# Patient Record
Sex: MALE | Race: ASIAN | URBAN - METROPOLITAN AREA
[De-identification: names, ages, dates, MRNs, and addresses within clinical notes are randomized per-mention and may not be internally consistent; named-entity substitution may affect disease eponyms.]

---

## 2023-07-20 ENCOUNTER — OFFICE VISIT (OUTPATIENT)
Dept: FAMILY MEDICINE CLINIC | Age: 83
End: 2023-07-20
Payer: COMMERCIAL

## 2023-07-20 VITALS
SYSTOLIC BLOOD PRESSURE: 100 MMHG | OXYGEN SATURATION: 97 % | HEART RATE: 78 BPM | HEIGHT: 64 IN | RESPIRATION RATE: 16 BRPM | DIASTOLIC BLOOD PRESSURE: 56 MMHG | TEMPERATURE: 97.1 F | BODY MASS INDEX: 21.72 KG/M2 | WEIGHT: 127.2 LBS

## 2023-07-20 DIAGNOSIS — F51.01 PRIMARY INSOMNIA: ICD-10-CM

## 2023-07-20 DIAGNOSIS — Z86.73 HISTORY OF CVA (CEREBROVASCULAR ACCIDENT): ICD-10-CM

## 2023-07-20 DIAGNOSIS — K21.9 GASTROESOPHAGEAL REFLUX DISEASE WITHOUT ESOPHAGITIS: ICD-10-CM

## 2023-07-20 DIAGNOSIS — I10 PRIMARY HYPERTENSION: ICD-10-CM

## 2023-07-20 DIAGNOSIS — I65.23 BILATERAL CAROTID ARTERY STENOSIS: ICD-10-CM

## 2023-07-20 DIAGNOSIS — Z91.81 AT HIGH RISK FOR FALLS: ICD-10-CM

## 2023-07-20 DIAGNOSIS — Z98.890 HISTORY OF CAROTID ENDARTERECTOMY: ICD-10-CM

## 2023-07-20 DIAGNOSIS — Z76.89 ENCOUNTER TO ESTABLISH CARE: Primary | ICD-10-CM

## 2023-07-20 LAB
ALBUMIN SERPL-MCNC: 4.2 G/DL (ref 3.4–5)
ALBUMIN/GLOB SERPL: 1.4 {RATIO} (ref 1.1–2.2)
ALP SERPL-CCNC: 71 U/L (ref 40–129)
ALT SERPL-CCNC: 11 U/L (ref 10–40)
ANION GAP SERPL CALCULATED.3IONS-SCNC: 10 MMOL/L (ref 3–16)
AST SERPL-CCNC: 16 U/L (ref 15–37)
BASOPHILS # BLD: 0 K/UL (ref 0–0.2)
BASOPHILS NFR BLD: 0.8 %
BILIRUB SERPL-MCNC: 0.9 MG/DL (ref 0–1)
BUN SERPL-MCNC: 9 MG/DL (ref 7–20)
CALCIUM SERPL-MCNC: 10 MG/DL (ref 8.3–10.6)
CHLORIDE SERPL-SCNC: 102 MMOL/L (ref 99–110)
CO2 SERPL-SCNC: 29 MMOL/L (ref 21–32)
CREAT SERPL-MCNC: 1.1 MG/DL (ref 0.8–1.3)
DEPRECATED RDW RBC AUTO: 14.5 % (ref 12.4–15.4)
EOSINOPHIL # BLD: 0.1 K/UL (ref 0–0.6)
EOSINOPHIL NFR BLD: 2.9 %
GFR SERPLBLD CREATININE-BSD FMLA CKD-EPI: >60 ML/MIN/{1.73_M2}
GLUCOSE SERPL-MCNC: 173 MG/DL (ref 70–99)
HCT VFR BLD AUTO: 39.2 % (ref 40.5–52.5)
HGB BLD-MCNC: 13.6 G/DL (ref 13.5–17.5)
LYMPHOCYTES # BLD: 1.1 K/UL (ref 1–5.1)
LYMPHOCYTES NFR BLD: 32.8 %
MCH RBC QN AUTO: 29.7 PG (ref 26–34)
MCHC RBC AUTO-ENTMCNC: 34.6 G/DL (ref 31–36)
MCV RBC AUTO: 85.8 FL (ref 80–100)
MONOCYTES # BLD: 0.5 K/UL (ref 0–1.3)
MONOCYTES NFR BLD: 13.9 %
NEUTROPHILS # BLD: 1.6 K/UL (ref 1.7–7.7)
NEUTROPHILS NFR BLD: 49.6 %
PLATELET # BLD AUTO: 123 K/UL (ref 135–450)
PMV BLD AUTO: 9.1 FL (ref 5–10.5)
POTASSIUM SERPL-SCNC: 3.8 MMOL/L (ref 3.5–5.1)
PROT SERPL-MCNC: 7.1 G/DL (ref 6.4–8.2)
RBC # BLD AUTO: 4.57 M/UL (ref 4.2–5.9)
SODIUM SERPL-SCNC: 141 MMOL/L (ref 136–145)
WBC # BLD AUTO: 3.3 K/UL (ref 4–11)

## 2023-07-20 PROCEDURE — 3078F DIAST BP <80 MM HG: CPT | Performed by: FAMILY MEDICINE

## 2023-07-20 PROCEDURE — G0442 ANNUAL ALCOHOL SCREEN 15 MIN: HCPCS | Performed by: FAMILY MEDICINE

## 2023-07-20 PROCEDURE — 3725F SCREEN DEPRESSION PERFORMED: CPT | Performed by: FAMILY MEDICINE

## 2023-07-20 PROCEDURE — 1123F ACP DISCUSS/DSCN MKR DOCD: CPT | Performed by: FAMILY MEDICINE

## 2023-07-20 PROCEDURE — 1000F TOBACCO USE ASSESSED: CPT | Performed by: FAMILY MEDICINE

## 2023-07-20 PROCEDURE — 3074F SYST BP LT 130 MM HG: CPT | Performed by: FAMILY MEDICINE

## 2023-07-20 PROCEDURE — 99204 OFFICE O/P NEW MOD 45 MIN: CPT | Performed by: FAMILY MEDICINE

## 2023-07-20 RX ORDER — CARVEDILOL 3.12 MG/1
3.12 TABLET ORAL 2 TIMES DAILY WITH MEALS
COMMUNITY
End: 2023-07-20

## 2023-07-20 RX ORDER — AMLODIPINE BESYLATE 5 MG/1
5 TABLET ORAL DAILY
COMMUNITY
End: 2023-07-20 | Stop reason: SDUPTHER

## 2023-07-20 RX ORDER — ATORVASTATIN CALCIUM 40 MG/1
40 TABLET, FILM COATED ORAL NIGHTLY
Qty: 90 TABLET | Refills: 3 | Status: SHIPPED | OUTPATIENT
Start: 2023-07-20

## 2023-07-20 RX ORDER — PANTOPRAZOLE SODIUM 40 MG/1
40 TABLET, DELAYED RELEASE ORAL DAILY
Qty: 90 TABLET | Refills: 3 | Status: SHIPPED | OUTPATIENT
Start: 2023-07-20

## 2023-07-20 RX ORDER — ACETAMINOPHEN 325 MG/1
650 TABLET ORAL EVERY 4 HOURS PRN
COMMUNITY

## 2023-07-20 RX ORDER — LISINOPRIL 10 MG/1
10 TABLET ORAL DAILY
Qty: 90 TABLET | Refills: 3 | Status: SHIPPED | OUTPATIENT
Start: 2023-07-20

## 2023-07-20 RX ORDER — ASPIRIN 81 MG/1
81 TABLET ORAL DAILY
Qty: 90 TABLET | Refills: 3 | Status: SHIPPED | OUTPATIENT
Start: 2023-07-20

## 2023-07-20 RX ORDER — AMLODIPINE BESYLATE 5 MG/1
5 TABLET ORAL DAILY
Qty: 90 TABLET | Refills: 3 | Status: SHIPPED | OUTPATIENT
Start: 2023-07-20

## 2023-07-20 RX ORDER — ATORVASTATIN CALCIUM 40 MG/1
40 TABLET, FILM COATED ORAL NIGHTLY
COMMUNITY
End: 2023-07-20 | Stop reason: SDUPTHER

## 2023-07-20 RX ORDER — LISINOPRIL 10 MG/1
10 TABLET ORAL DAILY
COMMUNITY
End: 2023-07-20 | Stop reason: SDUPTHER

## 2023-07-20 RX ORDER — ISOSORBIDE MONONITRATE 60 MG/1
60 TABLET, EXTENDED RELEASE ORAL DAILY
COMMUNITY
End: 2023-07-20

## 2023-07-20 RX ORDER — TRAZODONE HYDROCHLORIDE 100 MG/1
100 TABLET ORAL NIGHTLY
COMMUNITY
End: 2023-07-20 | Stop reason: SDUPTHER

## 2023-07-20 RX ORDER — CLOPIDOGREL BISULFATE 75 MG/1
75 TABLET ORAL DAILY
COMMUNITY
End: 2023-07-20

## 2023-07-20 RX ORDER — ASPIRIN 81 MG/1
81 TABLET, CHEWABLE ORAL NIGHTLY
COMMUNITY
End: 2023-07-20

## 2023-07-20 RX ORDER — TRAZODONE HYDROCHLORIDE 100 MG/1
100 TABLET ORAL NIGHTLY
Qty: 90 TABLET | Refills: 3 | Status: SHIPPED | OUTPATIENT
Start: 2023-07-20

## 2023-07-20 RX ORDER — PANTOPRAZOLE SODIUM 40 MG/1
40 TABLET, DELAYED RELEASE ORAL DAILY
COMMUNITY
End: 2023-07-20 | Stop reason: SDUPTHER

## 2023-07-20 ASSESSMENT — PATIENT HEALTH QUESTIONNAIRE - PHQ9
SUM OF ALL RESPONSES TO PHQ QUESTIONS 1-9: 2
2. FEELING DOWN, DEPRESSED OR HOPELESS: 1
SUM OF ALL RESPONSES TO PHQ QUESTIONS 1-9: 2
SUM OF ALL RESPONSES TO PHQ9 QUESTIONS 1 & 2: 2
1. LITTLE INTEREST OR PLEASURE IN DOING THINGS: 1

## 2023-07-20 NOTE — PROGRESS NOTES
ASSESSMENT/PLAN:  1. Encounter to establish care  2. At high risk for falls  3. History of CVA (cerebrovascular accident)  Est, stable. Uncertain etiology, however, pt has hx of endarterectomy, so possibly related to carotid stenosis. Scant records to review. Uncertain when pt had CEA. Stop plavix for now. Continue aspirin and high intensity statin for secondary prevention. Repeat carotid US  Fasting labs today. May need referral to vascular but didn't discuss this  -     atorvastatin (LIPITOR) 40 MG tablet; Take 1 tablet by mouth at bedtime, Disp-90 tablet, R-3Normal  -     Hemoglobin A1C; Future  -     Comprehensive Metabolic Panel; Future  -     CBC with Auto Differential; Future  -     aspirin 81 MG EC tablet; Take 1 tablet by mouth daily, Disp-90 tablet, R-3Normal  -     VL DUP CAROTID BILATERAL; Future  4. Bilateral carotid artery stenosis  -     VL DUP CAROTID BILATERAL; Future  5. History of carotid endarterectomy  -     VL DUP CAROTID BILATERAL; Future  6. Primary insomnia  Est, uncontrolled. Restart trazodone  -     traZODone (DESYREL) 100 MG tablet; Take 1 tablet by mouth nightly, Disp-90 tablet, R-3Normal  7. Gastroesophageal reflux disease without esophagitis  Est, uncontrolled. Restart protonix  -     pantoprazole (PROTONIX) 40 MG tablet; Take 1 tablet by mouth daily, Disp-90 tablet, R-3Normal  8. Primary hypertension  Est, possibly overcontrolled as very low here. Check home bps. Goal 130/80. Continue norvasc 5mg and lisinopril 5mg only. Previously also on imdur.  -     amLODIPine (NORVASC) 5 MG tablet; Take 1 tablet by mouth daily, Disp-90 tablet, R-3Normal  -     lisinopril (PRINIVIL;ZESTRIL) 10 MG tablet; Take 1 tablet by mouth daily, Disp-90 tablet, R-3Normal      Return in about 1 month (around 8/20/2023) for lab review, DM2, HTN follow up.     Electronically signed by Eileen Crouch MD on 7/20/2023 at 2:12 PM EDT    Please note, portions of this note were completed with a voice recognition

## 2023-07-20 NOTE — PATIENT INSTRUCTIONS
Blood work today     BLOOD PRESSURE should be 130/80    Stop metformin and carvedilol    See me in 1 month

## 2023-07-21 LAB
EST. AVERAGE GLUCOSE BLD GHB EST-MCNC: 119.8 MG/DL
HBA1C MFR BLD: 5.8 %

## 2023-07-24 ENCOUNTER — TELEPHONE (OUTPATIENT)
Dept: FAMILY MEDICINE CLINIC | Age: 83
End: 2023-07-24

## 2023-07-24 RX ORDER — ISOSORBIDE MONONITRATE 60 MG/1
TABLET, EXTENDED RELEASE ORAL
COMMUNITY
Start: 2023-07-21

## 2023-07-24 NOTE — TELEPHONE ENCOUNTER
Patient son called in asking  for a refill on Plavix but it looks like it has been discontinued.   Please advise and send in the medication if the patient should be taking this medication

## 2023-08-04 ENCOUNTER — TELEPHONE (OUTPATIENT)
Dept: FAMILY MEDICINE CLINIC | Age: 83
End: 2023-08-04

## 2023-08-04 NOTE — TELEPHONE ENCOUNTER
Patient's child Brian Day) called on his behalf to check in on home healthcare paperwork given to provider on previous appointment date (07/20/2023). They'd like to know if it has been completed and if it can be sent to home care company. Home care states they have not yet received anything from our office and I was unable to locate paperwork in . Please advise.

## 2023-08-24 ENCOUNTER — HOSPITAL ENCOUNTER (OUTPATIENT)
Dept: VASCULAR LAB | Age: 83
Discharge: HOME | End: 2023-08-24
Attending: FAMILY MEDICINE
Payer: COMMERCIAL

## 2023-08-24 DIAGNOSIS — Z86.73 HISTORY OF CVA (CEREBROVASCULAR ACCIDENT): ICD-10-CM

## 2023-08-24 DIAGNOSIS — I65.23 BILATERAL CAROTID ARTERY STENOSIS: ICD-10-CM

## 2023-08-24 DIAGNOSIS — Z98.890 HISTORY OF CAROTID ENDARTERECTOMY: ICD-10-CM

## 2023-08-24 PROCEDURE — 93880 EXTRACRANIAL BILAT STUDY: CPT

## 2023-08-25 ENCOUNTER — OFFICE VISIT (OUTPATIENT)
Dept: FAMILY MEDICINE CLINIC | Age: 83
End: 2023-08-25
Payer: COMMERCIAL

## 2023-08-25 VITALS
SYSTOLIC BLOOD PRESSURE: 124 MMHG | HEIGHT: 64 IN | HEART RATE: 72 BPM | WEIGHT: 126 LBS | OXYGEN SATURATION: 98 % | DIASTOLIC BLOOD PRESSURE: 68 MMHG | BODY MASS INDEX: 21.51 KG/M2

## 2023-08-25 DIAGNOSIS — Z11.59 NEED FOR HEPATITIS C SCREENING TEST: ICD-10-CM

## 2023-08-25 DIAGNOSIS — Z98.890 HISTORY OF CAROTID ENDARTERECTOMY: ICD-10-CM

## 2023-08-25 DIAGNOSIS — D70.8 OTHER NEUTROPENIA (HCC): Primary | ICD-10-CM

## 2023-08-25 DIAGNOSIS — I21.9 MYOCARDIAL INFARCTION, UNSPECIFIED MI TYPE, UNSPECIFIED ARTERY (HCC): ICD-10-CM

## 2023-08-25 DIAGNOSIS — Z86.73 HISTORY OF CVA (CEREBROVASCULAR ACCIDENT): ICD-10-CM

## 2023-08-25 DIAGNOSIS — Z11.9 SCREENING EXAMINATION FOR INFECTIOUS DISEASE: ICD-10-CM

## 2023-08-25 DIAGNOSIS — D70.8 OTHER NEUTROPENIA (HCC): ICD-10-CM

## 2023-08-25 LAB
BASOPHILS # BLD: 0 K/UL (ref 0–0.2)
BASOPHILS NFR BLD: 0.8 %
CHOLEST SERPL-MCNC: 98 MG/DL (ref 0–199)
DEPRECATED RDW RBC AUTO: 13.6 % (ref 12.4–15.4)
EOSINOPHIL # BLD: 0.1 K/UL (ref 0–0.6)
EOSINOPHIL NFR BLD: 2.3 %
HCT VFR BLD AUTO: 43.6 % (ref 40.5–52.5)
HCV AB SERPL QL IA: NORMAL
HDLC SERPL-MCNC: 44 MG/DL (ref 40–60)
HGB BLD-MCNC: 14.8 G/DL (ref 13.5–17.5)
LDLC SERPL CALC-MCNC: 34 MG/DL
LYMPHOCYTES # BLD: 1 K/UL (ref 1–5.1)
LYMPHOCYTES NFR BLD: 28 %
MCH RBC QN AUTO: 28.7 PG (ref 26–34)
MCHC RBC AUTO-ENTMCNC: 33.9 G/DL (ref 31–36)
MCV RBC AUTO: 84.7 FL (ref 80–100)
MONOCYTES # BLD: 0.5 K/UL (ref 0–1.3)
MONOCYTES NFR BLD: 12.9 %
NEUTROPHILS # BLD: 2 K/UL (ref 1.7–7.7)
NEUTROPHILS NFR BLD: 56 %
PLATELET # BLD AUTO: 130 K/UL (ref 135–450)
PMV BLD AUTO: 9.9 FL (ref 5–10.5)
RBC # BLD AUTO: 5.15 M/UL (ref 4.2–5.9)
TRIGL SERPL-MCNC: 99 MG/DL (ref 0–150)
VLDLC SERPL CALC-MCNC: 20 MG/DL
WBC # BLD AUTO: 3.6 K/UL (ref 4–11)

## 2023-08-25 PROCEDURE — 1123F ACP DISCUSS/DSCN MKR DOCD: CPT | Performed by: FAMILY MEDICINE

## 2023-08-25 PROCEDURE — 3074F SYST BP LT 130 MM HG: CPT | Performed by: FAMILY MEDICINE

## 2023-08-25 PROCEDURE — 3078F DIAST BP <80 MM HG: CPT | Performed by: FAMILY MEDICINE

## 2023-08-25 PROCEDURE — 99214 OFFICE O/P EST MOD 30 MIN: CPT | Performed by: FAMILY MEDICINE

## 2023-08-25 RX ORDER — NITROGLYCERIN 0.4 MG/1
TABLET SUBLINGUAL
COMMUNITY

## 2023-08-25 RX ORDER — CARVEDILOL 3.12 MG/1
3.12 TABLET ORAL 2 TIMES DAILY
Qty: 180 TABLET | Refills: 3 | Status: SHIPPED | OUTPATIENT
Start: 2023-08-25

## 2023-08-25 RX ORDER — CARVEDILOL 3.12 MG/1
1 TABLET ORAL 2 TIMES DAILY
COMMUNITY
End: 2023-08-25 | Stop reason: ALTCHOICE

## 2023-08-25 NOTE — PROGRESS NOTES
Chief complaint: Follow-up      SUBJECTIVE:  HPI  Arvind Swanson (:  1940) is a 78 y.o. male with a past medical history of DM2, HTN, HLD, CVA x3, last in 2023,. Hx of Rt carotid endarterectomy in 2019  who presents with a chief complaint of: 1 mos f/u. Established w/ me on . Here with son and sister. Had carotid US yesterday. Reviewed w/ pt - normal.    He feels much better on current blood pressure meds- only norvasc and lisinopril. He is no longer dizzy and can walk up stairs with more energy. He couldn't do stairs before. he's happy with me. Hx of MI. No chest pain, sob, swelling in feet. I took him of carvedilol. Hx of MI on paperwork (son states he hasn't had a heart attack. No hx of stends or heart surgery) only saw heart doctor once when he had a stroke, in the hospital.     Selvin Verduzco is here with him, his son. Lives with son, his son's 2 kids, pt, pt's sister and daughter in law.     Patient Active Problem List   Diagnosis    At high risk for falls    History of CVA (cerebrovascular accident)    Bilateral carotid artery stenosis    History of carotid endarterectomy    Primary insomnia    Gastroesophageal reflux disease without esophagitis    Myocardial infarction ([address])    Hypertensive disorder    Mental disorder    Other neutropenia ([address])     Past Medical History:   Diagnosis Date    DM (diabetes mellitus), type 2 ([address] [facility])     Hypercholesteremia     Hypertension     Stroke [facility])      Current Outpatient Medications on File Prior to Visit   Medication Sig Dispense Refill    acetaminophen (TYLENOL) 325 MG tablet Take 2 tablets by mouth every 4 hours as needed for Pain      atorvastatin (LIPITOR) 40 MG tablet Take 1 tablet by mouth at bedtime 90 tablet 3    traZODone (DESYREL) 100 MG tablet Take 1 tablet by mouth nightly 90 tablet 3    pantoprazole (PROTONIX) 40 MG tablet Take 1 tablet by mouth daily 90 tablet 3    aspirin 81 MG EC tablet Take 1 tablet by mouth daily 90 tablet 3    lisinopril

## 2023-08-26 LAB
HIV 1+2 AB+HIV1 P24 AG SERPL QL IA: NORMAL
HIV 2 AB SERPL QL IA: NORMAL
HIV1 AB SERPL QL IA: NORMAL
HIV1 P24 AG SERPL QL IA: NORMAL

## 2023-09-22 ENCOUNTER — OFFICE VISIT (OUTPATIENT)
Dept: FAMILY MEDICINE CLINIC | Age: 83
End: 2023-09-22
Payer: COMMERCIAL

## 2023-09-22 VITALS — SYSTOLIC BLOOD PRESSURE: 118 MMHG | HEART RATE: 72 BPM | DIASTOLIC BLOOD PRESSURE: 62 MMHG

## 2023-09-22 DIAGNOSIS — I21.9 MYOCARDIAL INFARCTION, UNSPECIFIED MI TYPE, UNSPECIFIED ARTERY (HCC): ICD-10-CM

## 2023-09-22 DIAGNOSIS — Z23 NEED FOR VACCINATION: ICD-10-CM

## 2023-09-22 DIAGNOSIS — Z09 HOSPITAL DISCHARGE FOLLOW-UP: Primary | ICD-10-CM

## 2023-09-22 DIAGNOSIS — I65.23 BILATERAL CAROTID ARTERY STENOSIS: ICD-10-CM

## 2023-09-22 DIAGNOSIS — R73.9 ELEVATED BLOOD SUGAR: ICD-10-CM

## 2023-09-22 DIAGNOSIS — Z86.73 HISTORY OF CVA (CEREBROVASCULAR ACCIDENT): ICD-10-CM

## 2023-09-22 PROBLEM — E11.9 DIABETES MELLITUS (HCC): Status: ACTIVE | Noted: 2023-09-13

## 2023-09-22 PROBLEM — I63.9 ACUTE STROKE DUE TO ISCHEMIA (HCC): Status: ACTIVE | Noted: 2023-09-13

## 2023-09-22 PROCEDURE — 90471 IMMUNIZATION ADMIN: CPT | Performed by: FAMILY MEDICINE

## 2023-09-22 PROCEDURE — 90694 VACC AIIV4 NO PRSRV 0.5ML IM: CPT | Performed by: FAMILY MEDICINE

## 2023-09-22 PROCEDURE — 99214 OFFICE O/P EST MOD 30 MIN: CPT | Performed by: FAMILY MEDICINE

## 2023-09-22 PROCEDURE — 3074F SYST BP LT 130 MM HG: CPT | Performed by: FAMILY MEDICINE

## 2023-09-22 PROCEDURE — 1111F DSCHRG MED/CURRENT MED MERGE: CPT | Performed by: FAMILY MEDICINE

## 2023-09-22 PROCEDURE — 3078F DIAST BP <80 MM HG: CPT | Performed by: FAMILY MEDICINE

## 2023-09-22 PROCEDURE — 1123F ACP DISCUSS/DSCN MKR DOCD: CPT | Performed by: FAMILY MEDICINE

## 2023-09-22 RX ORDER — CLOPIDOGREL BISULFATE 75 MG/1
75 TABLET ORAL DAILY
COMMUNITY
Start: 2023-09-14

## 2023-09-22 NOTE — PROGRESS NOTES
Post-Discharge Transitional Care Management Progress Note      Bruno Romo   YOB: 1940    Date of Office Visit:  9/22/2023  Date of Hospital Admission: 9/13  Date of Hospital Discharge: 9/14    Care management risk score Rising risk (score 2-5) and Complex Care (Scores >=6): No Risk Score On File     Non face to face  following discharge, date last encounter closed (first attempt may have been earlier): *No documented post hospital discharge outreach found in the last 14 days *No documented post hospital discharge outreach found in the last 14 days    Call initiated 2 business days of discharge: *No response recorded in the last 14 days    ASSESSMENT/PLAN:   Hospital discharge follow-up  -     [place] DISCHARGE MEDS RECONCILED W/ CURRENT OUTPATIENT MED LIST  -     External Referral To Home Health  History of CVA (cerebrovascular accident)  Est, stable. MRI neg for acute CVA. Continue plavix lifelong. Stop aspirin 81mg daily. Call to schedule with neurology at [facility]. Phone # provided  Will place referral to [address] [facility] RN - son hasn't had a call yet, which seems too long to me. Recheck labs. -     External Referral To Home Health  -     Basic Metabolic Panel; Future  -     Hemoglobin A1C; Future  Myocardial infarction, unspecified MI type, unspecified artery ([address])  -     External Referral To Home Health  Bilateral carotid artery stenosis  -     External Referral To Home Health  Elevated blood sugar  -     Basic Metabolic Panel; Future  -     Hemoglobin A1C; Future  Need for vaccination  -     Influenza, FLUAD, (age 72 y+), IM, Preservative Free, 0.5 mL    Medical Decision Making: moderate complexity  Return in about 4 weeks (around 10/20/2023) for blood work follow up.          Subjective:   HPI:  Follow up of Hospital problems/diagnosis(es):   Acute right-sided weakness and dysarthria-cannot rule out or rule out left hemispheric CVA    Active Problems:  Diabetes mellitus (HC CODE)  Essential

## 2023-09-22 NOTE — PATIENT INSTRUCTIONS
Ranjan Becerra, APRN  [address]  [phone]    Follow up  Call to schedule 4 week follow up appointment

## 2023-10-09 DIAGNOSIS — I21.9 MYOCARDIAL INFARCTION, UNSPECIFIED MI TYPE, UNSPECIFIED ARTERY (HCC): ICD-10-CM

## 2023-10-09 RX ORDER — CARVEDILOL 3.12 MG/1
3.12 TABLET ORAL 2 TIMES DAILY
Qty: 180 TABLET | Refills: 3 | Status: SHIPPED | OUTPATIENT
Start: 2023-10-09

## 2023-10-09 NOTE — TELEPHONE ENCOUNTER
Medication:   Requested Prescriptions     Pending Prescriptions Disp Refills    carvedilol (COREG) 3.125 MG tablet 180 tablet 3     Sig: Take 1 tablet by mouth 2 times daily        Last Filled:  08/25/2023 #[id number]    Patient Phone Number: [phone] (home)     Last appt: 9/22/2023   Next appt: 10/20/2023    Last OARRS:        No data to display              Patient states he is out of the medication and that the pharmacy needs a refill

## 2023-10-20 ENCOUNTER — OFFICE VISIT (OUTPATIENT)
Dept: FAMILY MEDICINE CLINIC | Age: 83
End: 2023-10-20

## 2023-10-20 VITALS — DIASTOLIC BLOOD PRESSURE: 68 MMHG | SYSTOLIC BLOOD PRESSURE: 112 MMHG | HEART RATE: 84 BPM | OXYGEN SATURATION: 100 %

## 2023-10-20 DIAGNOSIS — Z98.890 HISTORY OF CAROTID ENDARTERECTOMY: ICD-10-CM

## 2023-10-20 DIAGNOSIS — I10 PRIMARY HYPERTENSION: ICD-10-CM

## 2023-10-20 DIAGNOSIS — H53.413 VISION LOSS, CENTRAL, BILATERAL: ICD-10-CM

## 2023-10-20 DIAGNOSIS — E11.59 TYPE 2 DIABETES MELLITUS WITH OTHER CIRCULATORY COMPLICATION, WITHOUT LONG-TERM CURRENT USE OF INSULIN (HCC): Primary | ICD-10-CM

## 2023-10-20 DIAGNOSIS — Z86.73 HISTORY OF CVA (CEREBROVASCULAR ACCIDENT): ICD-10-CM

## 2023-10-20 DIAGNOSIS — I63.9 ACUTE STROKE DUE TO ISCHEMIA (HCC): ICD-10-CM

## 2023-10-20 RX ORDER — DIVALPROEX SODIUM 250 MG/1
TABLET, DELAYED RELEASE ORAL
COMMUNITY
Start: 2023-10-17

## 2023-10-20 RX ORDER — MEMANTINE HYDROCHLORIDE 5 MG/1
5 TABLET ORAL 2 TIMES DAILY
COMMUNITY

## 2023-10-20 NOTE — PROGRESS NOTES
Chief complaint: Follow-up      SUBJECTIVE:  HPI  Bud Hinds (:  1940) is a 78 y.o. male with a past medical history of DM who presents with a chief complaint of: f/u labs. Saw neuro at [facility] - started memantine and depakote. Instructed to call late oct/early nov for f/u. Memory loss. Lipitor inc to 40mg daily. Needs referral to dentist and eye doctor  Medicaid ins. Had catgaract surgery but vision is worsening again. Wants to see eye doctor.     Patient Active Problem List   Diagnosis    At high risk for falls    History of CVA (cerebrovascular accident)    Bilateral carotid artery stenosis    History of carotid endarterectomy    Primary insomnia    Gastroesophageal reflux disease without esophagitis    Myocardial infarction ([address])    Hypertensive disorder    Mental disorder    Other neutropenia (HCC)    Diabetes mellitus ([address])    Vision loss, central, bilateral     Past Medical History:   Diagnosis Date    DM (diabetes mellitus), type 2 ([address])     Hypercholesteremia     Hypertension     Stroke [facility])      Current Outpatient Medications on File Prior to Visit   Medication Sig Dispense Refill    divalproex (DEPAKOTE) 250 MG DR tablet       memantine (NAMENDA) 5 MG tablet Take 1 tablet by mouth 2 times daily      carvedilol (COREG) 3.125 MG tablet Take 1 tablet by mouth 2 times daily 180 tablet 3    clopidogrel (PLAVIX) 75 MG tablet Take 1 tablet by mouth daily      nitroGLYCERIN (NITROSTAT) 0.4 MG SL tablet PLEASE SEE ATTACHED FOR DETAILED DIRECTIONS      acetaminophen (TYLENOL) 325 MG tablet Take 2 tablets by mouth every 4 hours as needed for Pain      atorvastatin (LIPITOR) 40 MG tablet Take 1 tablet by mouth at bedtime 90 tablet 3    traZODone (DESYREL) 100 MG tablet Take 1 tablet by mouth nightly 90 tablet 3    pantoprazole (PROTONIX) 40 MG tablet Take 1 tablet by mouth daily 90 tablet 3    lisinopril (PRINIVIL;ZESTRIL) 10 MG tablet Take 1 tablet by mouth daily 90 tablet 3     No current

## 2023-10-30 ENCOUNTER — TELEPHONE (OUTPATIENT)
Dept: FAMILY MEDICINE CLINIC | Age: 83
End: 2023-10-30

## 2023-10-30 DIAGNOSIS — Z86.73 HISTORY OF CVA (CEREBROVASCULAR ACCIDENT): Primary | ICD-10-CM

## 2023-10-30 RX ORDER — CLOPIDOGREL BISULFATE 75 MG/1
75 TABLET ORAL DAILY
Qty: 90 TABLET | Refills: 3 | Status: SHIPPED | OUTPATIENT
Start: 2023-10-30

## 2023-10-30 NOTE — TELEPHONE ENCOUNTER
Patient's son Lupe Duff) called regarding medication. ..  clopidogrel (PLAVIX) 75 MG tablet   9/14/2023     Sig - Route: Take 1 tablet by mouth daily - Oral      He'd like to know if patient is still supposed to take this medication. If so, please send a refill to. ..   [address],  [address]

## 2023-12-06 ENCOUNTER — OFFICE VISIT (OUTPATIENT)
Dept: FAMILY MEDICINE CLINIC | Age: 83
End: 2023-12-06
Payer: COMMERCIAL

## 2023-12-06 VITALS
TEMPERATURE: 97 F | RESPIRATION RATE: 16 BRPM | HEART RATE: 73 BPM | OXYGEN SATURATION: 100 % | SYSTOLIC BLOOD PRESSURE: 120 MMHG | WEIGHT: 124 LBS | BODY MASS INDEX: 21.28 KG/M2 | DIASTOLIC BLOOD PRESSURE: 68 MMHG

## 2023-12-06 DIAGNOSIS — Z86.73 HISTORY OF CVA (CEREBROVASCULAR ACCIDENT): Primary | ICD-10-CM

## 2023-12-06 DIAGNOSIS — I10 PRIMARY HYPERTENSION: ICD-10-CM

## 2023-12-06 DIAGNOSIS — Z09 HOSPITAL DISCHARGE FOLLOW-UP: ICD-10-CM

## 2023-12-06 PROCEDURE — 99215 OFFICE O/P EST HI 40 MIN: CPT | Performed by: FAMILY MEDICINE

## 2023-12-06 PROCEDURE — 1111F DSCHRG MED/CURRENT MED MERGE: CPT | Performed by: FAMILY MEDICINE

## 2023-12-06 RX ORDER — DOXYCYCLINE HYCLATE 100 MG
TABLET ORAL
COMMUNITY
Start: 2023-12-01

## 2023-12-06 RX ORDER — FLUTICASONE PROPIONATE 50 MCG
SPRAY, SUSPENSION (ML) NASAL
COMMUNITY
Start: 2023-11-29

## 2023-12-06 RX ORDER — BENZONATATE 100 MG/1
100 CAPSULE ORAL 3 TIMES DAILY PRN
COMMUNITY
Start: 2023-11-28 | End: 2023-12-08

## 2023-12-06 RX ORDER — VALSARTAN 80 MG/1
80 TABLET ORAL DAILY
Qty: 30 TABLET | Refills: 11 | Status: SHIPPED | OUTPATIENT
Start: 2023-12-06

## 2023-12-06 NOTE — PATIENT INSTRUCTIONS
CALL THE  from Home health - where you can get the standard wheelchair and incontinence supplies?     I can send a fax to whatever medical supply takes your insurance  Pull ups  Pads  Wipes    Blood pressure should be 130/80

## 2023-12-06 NOTE — PROGRESS NOTES
Post-Discharge Transitional Care Management Progress Note      Bruno SENIA Padilla   YOB: 1940    Date of Office Visit:  12/6/2023  Date of Hospital Admission: 11/29/23  Date of Hospital Discharge: 12/1/23    Care management risk score Rising risk (score 2-5) and Complex Care (Scores >=6): No Risk Score On File     Non face to face  following discharge, date last encounter closed (first attempt may have been earlier): *No documented post hospital discharge outreach found in the last 14 days *No documented post hospital discharge outreach found in the last 14 days    Call initiated 2 business days of discharge: yes    ASSESSMENT/PLAN:   History of CVA (cerebrovascular accident)  Est, stable. Continue f/u with neuro - Ramila Diego needs to call for MRI w/ CSF flow, neuropsych testing. Pt is wearing holter  monitor for now. Taking depakote 250mg bid and namenda as prescribed by neuro. Higher level of needs- encouraged Ramila Diego to call  to see what other services he qualifies for. More urinary incontinence, not eating as much, not as much energy, seems to be giving up. Talk to  about getting standard wheelchair and incontinence supplies. I need the order and they need to find a DME supplier near their home in [place]. Ramila Diego agrees     Hospital discharge follow-up  -     [place] DISCHARGE MEDS RECONCILED W/ CURRENT OUTPATIENT MED LIST  Primary hypertension  Est, uncontrolled. Systolic [phone] via [facility] RN. Stop lisinopril. Start valsartan 80mg daily. Goal 140/90. call if still above goal. Looser goal d/t age and comorbidities. Need to optimize BP for secondary CVA prevention  -     valsartan (DIOVAN) 80 MG tablet; Take 1 tablet by mouth daily, Disp-30 tablet, R-11Normal      Medical Decision Making: high medical complexity  F/u in 2mos for BP follow up       Subjective:   HPI:  Follow up of Hospital problems/diagnosis(es): CAP    Inpatient course: Discharge summary reviewed- see chart.     Interval

## 2024-01-19 ENCOUNTER — TELEMEDICINE (OUTPATIENT)
Dept: FAMILY MEDICINE CLINIC | Age: 84
End: 2024-01-19
Payer: MEDICARE

## 2024-01-19 DIAGNOSIS — I65.23 BILATERAL CAROTID ARTERY STENOSIS: ICD-10-CM

## 2024-01-19 DIAGNOSIS — Z91.81 AT HIGH RISK FOR FALLS: ICD-10-CM

## 2024-01-19 DIAGNOSIS — R29.6 RECURRENT FALLS: ICD-10-CM

## 2024-01-19 DIAGNOSIS — I50.32 CHRONIC DIASTOLIC CHF (CONGESTIVE HEART FAILURE) (HCC): ICD-10-CM

## 2024-01-19 DIAGNOSIS — D69.6 THROMBOCYTOPENIA, UNSPECIFIED (HCC): ICD-10-CM

## 2024-01-19 DIAGNOSIS — R56.9 SEIZURE (HCC): ICD-10-CM

## 2024-01-19 DIAGNOSIS — Z09 HOSPITAL DISCHARGE FOLLOW-UP: Primary | ICD-10-CM

## 2024-01-19 DIAGNOSIS — E11.59 TYPE 2 DIABETES MELLITUS WITH OTHER CIRCULATORY COMPLICATION, WITHOUT LONG-TERM CURRENT USE OF INSULIN (HCC): ICD-10-CM

## 2024-01-19 DIAGNOSIS — R26.81 GAIT INSTABILITY: ICD-10-CM

## 2024-01-19 PROBLEM — R41.82 ALTERED MENTAL STATUS: Status: RESOLVED | Noted: 2024-01-06 | Resolved: 2024-01-19

## 2024-01-19 PROBLEM — R41.82 ALTERED MENTAL STATUS: Status: ACTIVE | Noted: 2024-01-06

## 2024-01-19 PROBLEM — D70.8 OTHER NEUTROPENIA (HCC): Status: RESOLVED | Noted: 2023-08-25 | Resolved: 2024-01-19

## 2024-01-19 PROBLEM — E11.9 DIABETES MELLITUS (HCC): Status: RESOLVED | Noted: 2023-09-13 | Resolved: 2024-01-19

## 2024-01-19 PROBLEM — I21.9 MYOCARDIAL INFARCTION (HCC): Status: RESOLVED | Noted: 2017-09-21 | Resolved: 2024-01-19

## 2024-01-19 PROCEDURE — 1111F DSCHRG MED/CURRENT MED MERGE: CPT | Performed by: FAMILY MEDICINE

## 2024-01-19 PROCEDURE — 1123F ACP DISCUSS/DSCN MKR DOCD: CPT | Performed by: FAMILY MEDICINE

## 2024-01-19 PROCEDURE — G8427 DOCREV CUR MEDS BY ELIG CLIN: HCPCS | Performed by: FAMILY MEDICINE

## 2024-01-19 PROCEDURE — 99215 OFFICE O/P EST HI 40 MIN: CPT | Performed by: FAMILY MEDICINE

## 2024-01-19 RX ORDER — LEVETIRACETAM 500 MG/1
500 TABLET ORAL 2 TIMES DAILY
COMMUNITY
Start: 2024-01-14

## 2024-01-19 RX ORDER — WARFARIN SODIUM 3 MG/1
3 TABLET ORAL DAILY
COMMUNITY
Start: 2024-01-14

## 2024-01-19 SDOH — ECONOMIC STABILITY: FOOD INSECURITY: WITHIN THE PAST 12 MONTHS, THE FOOD YOU BOUGHT JUST DIDN'T LAST AND YOU DIDN'T HAVE MONEY TO GET MORE.: NEVER TRUE

## 2024-01-19 SDOH — ECONOMIC STABILITY: INCOME INSECURITY: HOW HARD IS IT FOR YOU TO PAY FOR THE VERY BASICS LIKE FOOD, HOUSING, MEDICAL CARE, AND HEATING?: NOT HARD AT ALL

## 2024-01-19 SDOH — ECONOMIC STABILITY: HOUSING INSECURITY
IN THE LAST 12 MONTHS, WAS THERE A TIME WHEN YOU DID NOT HAVE A STEADY PLACE TO SLEEP OR SLEPT IN A SHELTER (INCLUDING NOW)?: NO

## 2024-01-19 SDOH — ECONOMIC STABILITY: FOOD INSECURITY: WITHIN THE PAST 12 MONTHS, YOU WORRIED THAT YOUR FOOD WOULD RUN OUT BEFORE YOU GOT MONEY TO BUY MORE.: NEVER TRUE

## 2024-01-19 ASSESSMENT — PATIENT HEALTH QUESTIONNAIRE - PHQ9
SUM OF ALL RESPONSES TO PHQ QUESTIONS 1-9: 0
1. LITTLE INTEREST OR PLEASURE IN DOING THINGS: 0
SUM OF ALL RESPONSES TO PHQ9 QUESTIONS 1 & 2: 0
2. FEELING DOWN, DEPRESSED OR HOPELESS: 0

## 2024-01-19 NOTE — PROGRESS NOTES
Post-Discharge Transitional Care Management Progress Note      Bruno Schmidt   YOB: 1940    Date of Office Visit:  1/19/2024  Date of Hospital Admission: 1/6/24  Date of Hospital Discharge: 1/14/24    Care management risk score Rising risk (score 2-5) and Complex Care (Scores >=6): No Risk Score On File     Non face to face  following discharge, date last encounter closed (first attempt may have been earlier): *No documented post hospital discharge outreach found in the last 14 days *No documented post hospital discharge outreach found in the last 14 days    Call initiated 2 business days of discharge: *No response recorded in the last 14 days    ASSESSMENT/PLAN:   Hospital discharge follow-up  -     [place] DISCHARGE MEDS RECONCILED W/ CURRENT OUTPATIENT MED LIST    Gait instability  Est, uncontrolled. Very complex patient. Continue HH PT. Referrals placed for toilet chair and stair lift. Recurrent CVA now with seizures. Recommend f/u with neuro at [facility], rather than [facility]. Had neuro f/u on 1/4/24 with [facility]- family stopped Depakote d/t making him feel weak and not helping. Didn't change AED therapy at that time. Possibly had a seizure that caused AMS prior to this admission on 1/6 two days later. Apical thrombus on echo, now on coumadin. High risk for morbidity d/t falls. MMSE 12/30 on 1/4/24 with neuro NP at [facility]. Appreciate neuro and cardiology help with long term management of these comorbidities.  -     DME Order for (Specify) as OP  -     DME Order for Patient Lift as OP    Type 2 diabetes mellitus with other circulatory complication, without long-term current use of insulin (HCC)  Est, stable. Complicated by CVA. No changes to medications  -     DME Order for (Specify) as OP  -     DME Order for Patient Lift as OP    Thrombocytopenia, unspecified (HCC)  Est, stable. No changes today.     Recurrent falls  Est, uncontrolled. Continue HH PT. Referrals placed for toilet chair and stair

## 2024-01-31 ENCOUNTER — TELEPHONE (OUTPATIENT)
Dept: FAMILY MEDICINE CLINIC | Age: 84
End: 2024-01-31

## 2024-01-31 NOTE — TELEPHONE ENCOUNTER
Pt was discharged today due to dementia at request of the son. They feel they can handle him at this point and he has improved.

## 2024-02-12 DIAGNOSIS — I51.3 APICAL MURAL THROMBUS: Primary | ICD-10-CM

## 2024-02-12 RX ORDER — WARFARIN SODIUM 3 MG/1
3 TABLET ORAL DAILY
Qty: 30 TABLET | Refills: 0 | Status: SHIPPED | OUTPATIENT
Start: 2024-02-12

## 2024-02-12 NOTE — TELEPHONE ENCOUNTER
----- Message from Sal Norman sent at 2/12/2024  9:49 AM EST -----  Subject: Refill Request    QUESTIONS  Name of Medication? warfarin (COUMADIN) 3 MG tablet  Patient-reported dosage and instructions? once a day  How many days do you have left? 0  Preferred Pharmacy? [facility]  Pharmacy phone number (if available)? [phone]  ---------------------------------------------------------------------------  --------------  CALL BACK INFO  What is the best way for the office to contact you? OK to leave message on   voicemail  Preferred Call Back Phone Number? [phone]  ---------------------------------------------------------------------------  --------------  SCRIPT ANSWERS  Relationship to Patient? Other/Third Party  Representative Name? Pema VIVAS  Is the representative on the Communication Release of Information (DANIEL)   form in Epic? Yes

## 2024-02-12 NOTE — TELEPHONE ENCOUNTER
Patient is scheduled to see cardiologist on 03/04/2024.    As the appointment is nearly a month away, would provider like to send script until appointment?    Please advise.     Voicemail is okay.

## 2024-02-12 NOTE — TELEPHONE ENCOUNTER
Medication:   Requested Prescriptions      No prescriptions requested or ordered in this encounter        Last Filled:      Patient Phone Number: [phone] (home)     Last appt: 1/19/2024   Next appt: Visit date not found    Last OARRS:        No data to display

## 2024-02-26 ENCOUNTER — TELEPHONE (OUTPATIENT)
Dept: FAMILY MEDICINE CLINIC | Age: 84
End: 2024-02-26

## 2024-02-26 NOTE — TELEPHONE ENCOUNTER
Patient requesting refill of...   Disp Refills Start End    carvedilol (COREG) 3.125 MG tablet 180 tablet 3 10/9/2023 --    Sig - Route: Take 1 tablet by mouth 2 times daily - Oral      Patient's child states that this dosage was doubled a few months ago, so he's requesting the change be reflected in the script sent to pharmacy.     Would like this expedited as he was out this morning.     Pharmacy...  [id number] [place], [place] - [address] [place] [address] [phone] -  [phone] [[id number]]

## 2024-02-28 ENCOUNTER — OFFICE VISIT (OUTPATIENT)
Dept: FAMILY MEDICINE CLINIC | Age: 84
End: 2024-02-28
Payer: MEDICARE

## 2024-02-28 ENCOUNTER — HOSPITAL ENCOUNTER (OUTPATIENT)
Dept: GENERAL RADIOLOGY | Age: 84
Discharge: HOME | End: 2024-02-28
Payer: MEDICARE

## 2024-02-28 VITALS
DIASTOLIC BLOOD PRESSURE: 86 MMHG | BODY MASS INDEX: 20.94 KG/M2 | TEMPERATURE: 96.8 F | OXYGEN SATURATION: 98 % | WEIGHT: 122 LBS | SYSTOLIC BLOOD PRESSURE: 122 MMHG | RESPIRATION RATE: 16 BRPM | HEART RATE: 73 BPM

## 2024-02-28 DIAGNOSIS — E11.59 TYPE 2 DIABETES MELLITUS WITH OTHER CIRCULATORY COMPLICATION, WITHOUT LONG-TERM CURRENT USE OF INSULIN (HCC): ICD-10-CM

## 2024-02-28 DIAGNOSIS — D69.6 THROMBOCYTOPENIA, UNSPECIFIED (HCC): ICD-10-CM

## 2024-02-28 DIAGNOSIS — J18.9 PNEUMONIA OF RIGHT LOWER LOBE DUE TO INFECTIOUS ORGANISM: Primary | ICD-10-CM

## 2024-02-28 DIAGNOSIS — R40.0 SOMNOLENCE: Primary | ICD-10-CM
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
## 2024-02-28 DIAGNOSIS — I50.32 CHRONIC DIASTOLIC CHF (CONGESTIVE HEART FAILURE) (HCC): ICD-10-CM

## 2024-02-28 DIAGNOSIS — R40.0 SOMNOLENCE: ICD-10-CM

## 2024-02-28 DIAGNOSIS — Z86.73 HISTORY OF CVA (CEREBROVASCULAR ACCIDENT): ICD-10-CM

## 2024-02-28 DIAGNOSIS — R56.9 SEIZURE (HCC): ICD-10-CM

## 2024-02-28 DIAGNOSIS — R29.6 RECURRENT FALLS: ICD-10-CM

## 2024-02-28 LAB
ALBUMIN SERPL-MCNC: 4.1 G/DL (ref 3.4–5)
ALBUMIN/GLOB SERPL: 1.1 {RATIO} (ref 1.1–2.2)
ALP SERPL-CCNC: 97 U/L (ref 40–129)
ALT SERPL-CCNC: 12 U/L (ref 10–40)
ANION GAP SERPL CALCULATED.3IONS-SCNC: 7 MMOL/L (ref 3–16)
AST SERPL-CCNC: 20 U/L (ref 15–37)
BACTERIA URNS QL MICRO: ABNORMAL /HPF
BASOPHILS # BLD: 0 K/UL (ref 0–0.2)
BASOPHILS NFR BLD: 0.5 %
BILIRUB SERPL-MCNC: 1.2 MG/DL (ref 0–1)
BILIRUB UR QL STRIP.AUTO: NEGATIVE
BUN SERPL-MCNC: 10 MG/DL (ref 7–20)
CALCIUM SERPL-MCNC: 9.9 MG/DL (ref 8.3–10.6)
CHLORIDE SERPL-SCNC: 102 MMOL/L (ref 99–110)
CLARITY UR: CLEAR
CO2 SERPL-SCNC: 30 MMOL/L (ref 21–32)
COLOR UR: YELLOW
CREAT SERPL-MCNC: 1.1 MG/DL (ref 0.8–1.3)
CRP SERPL-MCNC: 4.4 MG/L (ref 0–5.1)
DEPRECATED RDW RBC AUTO: 15.8 % (ref 12.4–15.4)
EOSINOPHIL # BLD: 0.1 K/UL (ref 0–0.6)
EOSINOPHIL NFR BLD: 1.7 %
EPI CELLS #/AREA URNS AUTO: 0 /HPF (ref 0–5)
ERYTHROCYTE [SEDIMENTATION RATE] IN BLOOD BY WESTERGREN METHOD: 30 MM/HR (ref 0–20)
GFR SERPLBLD CREATININE-BSD FMLA CKD-EPI: >60 ML/MIN/{1.73_M2}
GLUCOSE SERPL-MCNC: 94 MG/DL (ref 70–99)
GLUCOSE UR STRIP.AUTO-MCNC: 100 MG/DL
HCT VFR BLD AUTO: 40.9 % (ref 40.5–52.5)
HGB BLD-MCNC: 13.7 G/DL (ref 13.5–17.5)
HGB UR QL STRIP.AUTO: NEGATIVE
HYALINE CASTS #/AREA URNS AUTO: 0 /LPF (ref 0–8)
KETONES UR STRIP.AUTO-MCNC: NEGATIVE MG/DL
LEUKOCYTE ESTERASE UR QL STRIP.AUTO: NEGATIVE
LYMPHOCYTES # BLD: 1.3 K/UL (ref 1–5.1)
LYMPHOCYTES NFR BLD: 26.5 %
MCH RBC QN AUTO: 26.1 PG (ref 26–34)
MCHC RBC AUTO-ENTMCNC: 33.4 G/DL (ref 31–36)
MCV RBC AUTO: 78 FL (ref 80–100)
MONOCYTES # BLD: 0.7 K/UL (ref 0–1.3)
MONOCYTES NFR BLD: 14.2 %
NEUTROPHILS # BLD: 2.8 K/UL (ref 1.7–7.7)
NEUTROPHILS NFR BLD: 57.1 %
NITRITE UR QL STRIP.AUTO: NEGATIVE
PH UR STRIP.AUTO: 7 [PH] (ref 5–8)
PLATELET # BLD AUTO: 144 K/UL (ref 135–450)
PLATELET BLD QL SMEAR: ADEQUATE
PMV BLD AUTO: 9.9 FL (ref 5–10.5)
POTASSIUM SERPL-SCNC: 4.6 MMOL/L (ref 3.5–5.1)
PROT SERPL-MCNC: 7.7 G/DL (ref 6.4–8.2)
PROT UR STRIP.AUTO-MCNC: ABNORMAL MG/DL
RBC # BLD AUTO: 5.24 M/UL (ref 4.2–5.9)
RBC CLUMPS #/AREA URNS AUTO: 1 /HPF (ref 0–4)
SLIDE REVIEW: ABNORMAL
SODIUM SERPL-SCNC: 139 MMOL/L (ref 136–145)
SP GR UR STRIP.AUTO: 1.02 (ref 1–1.03)
UA DIPSTICK W REFLEX MICRO PNL UR: YES
URN SPEC COLLECT METH UR: ABNORMAL
UROBILINOGEN UR STRIP-ACNC: 1 E.U./DL
WBC # BLD AUTO: 4.9 K/UL (ref 4–11)
WBC #/AREA URNS AUTO: 0 /HPF (ref 0–5)

## 2024-02-28 PROCEDURE — G8420 CALC BMI NORM PARAMETERS: HCPCS | Performed by: FAMILY MEDICINE

## 2024-02-28 PROCEDURE — 99214 OFFICE O/P EST MOD 30 MIN: CPT | Performed by: FAMILY MEDICINE

## 2024-02-28 PROCEDURE — 1123F ACP DISCUSS/DSCN MKR DOCD: CPT | Performed by: FAMILY MEDICINE

## 2024-02-28 PROCEDURE — 71046 X-RAY EXAM CHEST 2 VIEWS: CPT

## 2024-02-28 PROCEDURE — G8484 FLU IMMUNIZE NO ADMIN: HCPCS | Performed by: FAMILY MEDICINE

## 2024-02-28 PROCEDURE — G8427 DOCREV CUR MEDS BY ELIG CLIN: HCPCS | Performed by: FAMILY MEDICINE

## 2024-02-28 PROCEDURE — 4004F PT TOBACCO SCREEN RCVD TLK: CPT | Performed by: FAMILY MEDICINE

## 2024-02-28 RX ORDER — AMOXICILLIN AND CLAVULANATE POTASSIUM 875; 125 MG/1; MG/1
1 TABLET, FILM COATED ORAL 2 TIMES DAILY
Qty: 14 TABLET | Refills: 0 | Status: SHIPPED | OUTPATIENT
Start: 2024-02-28 | End: 2024-03-06

## 2024-02-28 NOTE — PROGRESS NOTES
when came off AED and had confusion. Was admitted in jan for almost 14 days with this episode.    5. Thrombocytopenia, unspecified (HCC)  Est, stable. Continue to monitor    6. Type 2 diabetes mellitus with other circulatory complication, without long-term current use of insulin (HCC)  Est, stable. No changes today.  7. Recurrent falls  8. History of CVA (cerebrovascular accident)  Est, stable. On secondary prevention. Care per neuro    No follow-ups on file.    Electronically signed by Leonora Ames MD on 2/28/2024 at 5:08 PM.

## 2024-03-02 LAB — LEFT VENTRICULAR EJECTION FRACTION, EXTERNAL: 35

## 2024-03-04 ENCOUNTER — TELEPHONE (OUTPATIENT)
Dept: FAMILY MEDICINE CLINIC | Age: 84
End: 2024-03-04

## 2024-03-04 NOTE — TELEPHONE ENCOUNTER
Care Transitions Initial Follow Up Call    Outreach made within 2 business days of discharge: Yes    Patient: Bruno Schmidt Patient : 1940   MRN: [id number]  Reason for Admission: No discharge information exists for this patient.  Discharge Date:         Spoke with: Pema    Discharge department/facility: [facility] Interactive Patient Contact:  Was patient able to fill all prescriptions: Yes  Was patient instructed to bring all medications to the follow-up visit: Yes  Is patient taking all medications as directed in the discharge summary? Yes  Does patient understand their discharge instructions: Yes  Does patient have questions or concerns that need addressed prior to 7-14 day follow up office visit: no    Scheduled appointment with PCP within 7-14 days    Follow Up  Future Appointments   Date Time Provider Department Center   3/8/2024  9:00 AM Leonora Ames MD [facility] Tuyet Suazo MA

## 2024-03-08 ENCOUNTER — OFFICE VISIT (OUTPATIENT)
Dept: FAMILY MEDICINE CLINIC | Age: 84
End: 2024-03-08

## 2024-03-08 VITALS — HEART RATE: 70 BPM | DIASTOLIC BLOOD PRESSURE: 70 MMHG | OXYGEN SATURATION: 99 % | SYSTOLIC BLOOD PRESSURE: 124 MMHG

## 2024-03-08 DIAGNOSIS — I51.3 APICAL MURAL THROMBUS: ICD-10-CM

## 2024-03-08 DIAGNOSIS — I65.23 BILATERAL CAROTID ARTERY STENOSIS: ICD-10-CM

## 2024-03-08 DIAGNOSIS — I50.32 CHRONIC DIASTOLIC CHF (CONGESTIVE HEART FAILURE) (HCC): ICD-10-CM

## 2024-03-08 DIAGNOSIS — R26.81 GAIT INSTABILITY: ICD-10-CM

## 2024-03-08 DIAGNOSIS — Z86.73 HISTORY OF CVA (CEREBROVASCULAR ACCIDENT): Primary | ICD-10-CM

## 2024-03-08 DIAGNOSIS — R29.6 RECURRENT FALLS: ICD-10-CM
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
## 2024-03-08 DIAGNOSIS — R56.9 SEIZURE (HCC): ICD-10-CM

## 2024-03-08 DIAGNOSIS — Z09 HOSPITAL DISCHARGE FOLLOW-UP: ICD-10-CM

## 2024-03-08 RX ORDER — CARVEDILOL 3.12 MG/1
3.12 TABLET ORAL 2 TIMES DAILY
COMMUNITY

## 2024-03-08 NOTE — PROGRESS NOTES
Post-Discharge Transitional Care Management Progress Note      Bruno Schmidt   YOB: 1940    Date of Office Visit:  3/8/2024  Date of Hospital Admission: 3/1/24  Date of Hospital Discharge: 3/2/24    Care management risk score Rising risk (score 2-5) and Complex Care (Scores >=6): No Risk Score On File     Non face to face  following discharge, date last encounter closed (first attempt may have been earlier): 03/04/2024 03/04/2024    Call initiated 2 business days of discharge: Yes    ASSESSMENT/PLAN:   History of CVA (cerebrovascular accident)  Est, stable. Rt sided weakness with this recent admission. Recurrent falls. Graduated from PT, OT HH from admission in dec. Hx of 3 admissions since December. Recommend continued f/u with [facility] cardiology and neurology, not [facility] or [facility] (has been admitted to multiple hospitals and is told to f/u with each one's specialty clinic). Discussed full code with Pema, pt's nephew. He initially says to do CPR, but then after discussion, not sure that pt would want all of that done - chest compressions, tube in lungs, shocking. He will need to talk about it with his family. Referral to ACP.   Appropriate for palliative care- request assistance from AC to coordinate. May eventually need to transition to hospice  -     Ambulatory Referral to ACP Clinical Specialist  Chronic diastolic CHF (congestive heart failure) (HCC)  Est, stable. Continue f/u w/ cardiology  -     Ambulatory Referral to ACP Clinical Specialist  Seizure (HCC)  Est, stable. Continue current meds. F/u w/ [facility] neurology. Needs help with all ADLS, except can feed himself 50% of the time, per Pema.  -     Ambulatory Referral to ACP Clinical Specialist  Fall, initial encounter  Est, uncontrolled. Pema reports pt is stubborn and will not wait for someone to help him get up. Graduated from HH PT. Plan above for palliative care   -     Ambulatory Referral to ACP Clinical Specialist  Recurrent

## 2024-03-11 ENCOUNTER — CLINICAL DOCUMENTATION (OUTPATIENT)
Dept: SPIRITUAL SERVICES | Age: 84
End: 2024-03-11

## 2024-03-11 ENCOUNTER — CARE COORDINATION (OUTPATIENT)
Dept: CARE COORDINATION | Age: 84
End: 2024-03-11

## 2024-03-11 NOTE — CARE COORDINATION
Ambulatory Care Coordination Note  3/11/2024    Patient Current Location:  [place]    ACM contacted the family by telephone. Verified name and  with family as identifiers. Provided introduction to self, and explanation of the ACM role.     ACM: Libia Guardado RN    Challenges to be reviewed by the provider   Additional needs identified to be addressed with provider: No  none               Method of communication with provider: none    Pcp referral to care coordination; palliative care referral.    RN-CC outreached patients son, Pema Nelson. Confirmed he is listed on PHI release form. RN-CC introduced self and role of care coordinator.   Pmea states they are interested in a palliative care referral. No specific agency requested.  No other care coordination needs voiced at this time.   RN-CC informed Pema RN-CC will request referral from PCP and fax to [facility].     Enrollment not completed due to limited needs    PLAN:    Request palliative care referral from pcp         Offered patient enrollment in the Remote Patient Monitoring (RPM) program for in-home monitoring:  family only interested in palliative care referral at this time .        No future appointments.  ,   Diabetes Assessment               ,   Congestive Heart Failure Assessment           Symptoms:          ,   Hypertension - Encounter Level         , and   General Assessment

## 2024-03-11 NOTE — ACP (ADVANCE CARE PLANNING)
for SW to send ACP information, including portable DNR form to review. We will plan to discuss this on Wed Mar 13 at 11am.            [x] 2nd -  Date:  03/13/2024               Intervention:  [] Spoke with Patient  [] Left Voice mail [x] Email / Mail    [] [employer]  [] Other (Specify) :              Outcomes:Received message from pt son that he needs to reschedule today's scheduled ACP visit. Requested son to offer available date/time; will await response and reschedule accordingly.                 [] 3rd -  Date:                Intervention:  [] Spoke with Patient   [] Left Voice mail [] Email / Mail    [] [employer]  [] Other (Specify) :       Outcomes:    Thank you for this referral.

## 2024-03-12 ENCOUNTER — CARE COORDINATION (OUTPATIENT)
Dept: CARE COORDINATION | Age: 84
End: 2024-03-12

## 2024-03-12 DIAGNOSIS — Z86.73 HISTORY OF CVA (CEREBROVASCULAR ACCIDENT): ICD-10-CM

## 2024-03-12 RX ORDER — CLOPIDOGREL BISULFATE 75 MG/1
75 TABLET ORAL DAILY
Qty: 90 TABLET | Refills: 3 | OUTPATIENT
Start: 2024-03-12

## 2024-03-12 NOTE — CARE COORDINATION
RN-CC outreached [facility]; confirmed fax no. [phone].  Referral and supporting documents faxed.     Outbound call to Pema Schmidt to inform him that palliative care referral has been faxed to [facility]. No answer; LVM requesting call return.

## 2024-03-18 ENCOUNTER — CARE COORDINATION (OUTPATIENT)
Dept: CARE COORDINATION | Age: 84
End: 2024-03-18

## 2024-03-18 NOTE — CARE COORDINATION
RN-CC attempted to outreach patients son, Pema Schmidt. No answer; message left through  requesting call return.

## 2024-03-18 NOTE — CARE COORDINATION
RN-CC outreached [facility]; spoke w/Yan. Confirmed the palliative care referral was received on 3/12/24.   Yan transferred RN-CC to [facility] [phone] of Palliative Care to f/u on the status of the referral. No answer; message left through  requesting call return.

## 2024-03-19 ENCOUNTER — CARE COORDINATION (OUTPATIENT)
Dept: CARE COORDINATION | Age: 84
End: 2024-03-19

## 2024-03-19 NOTE — CARE COORDINATION
Incoming VM received from [facility] with [facility] [phone]. Per Bibiana, the referral was received and outreach was made to Pema Schmidt on 3/13 and 3/18 - no answer, no call return. Bibiana states she will follow up with Pema again later this week before closing the referral.

## 2024-03-20 ENCOUNTER — CARE COORDINATION (OUTPATIENT)
Dept: CARE COORDINATION | Age: 84
End: 2024-03-20

## 2024-03-20 NOTE — CARE COORDINATION
RN-CC attempted to outreach Pema Schmidt for cc follow up; palliative care. No answer; message left requesting call return.   [employer] message sent.     Incoming VM received from [facility] with [facility] [phone]. Per Bibiana, the referral was received and outreach was made to Pema Schmidt on 3/13 and 3/18 - no answer, no call return. Bibiana states she will follow up with Pema again later this week before closing the referral.

## 2024-03-25 ENCOUNTER — CARE COORDINATION (OUTPATIENT)
Dept: CARE COORDINATION | Age: 84
End: 2024-03-25

## 2024-03-25 NOTE — CARE COORDINATION
Incoming VM received from [facility] with [facility] [phone]. Per [facility], they have been unable to reach patient or family for Palliative Care evaluation. [facility] is closing the referral on their end.

## 2024-04-08 ENCOUNTER — CARE COORDINATION (OUTPATIENT)
Dept: CARE COORDINATION | Age: 84
End: 2024-04-08

## 2024-04-08 NOTE — CARE COORDINATION
Incoming VM received from [facility] with [facility] [phone]. Per [facility], they have been unable to reach patient or family for Palliative Care evaluation. [facility] is closing the referral on their end.     No call return received to date. RN-CC will resolve program and remain available if patient returns my call.

## 2024-04-10 DIAGNOSIS — Z86.73 HISTORY OF CVA (CEREBROVASCULAR ACCIDENT): ICD-10-CM

## 2024-04-10 DIAGNOSIS — R56.9 SEIZURE (HCC): ICD-10-CM

## 2024-04-10 DIAGNOSIS — R29.6 RECURRENT FALLS: Primary | ICD-10-CM

## 2024-04-10 DIAGNOSIS — R26.81 GAIT INSTABILITY: ICD-10-CM

## 2024-04-16 DIAGNOSIS — R56.9 SEIZURE (HCC): Primary | ICD-10-CM

## 2024-04-16 RX ORDER — LEVETIRACETAM 500 MG/1
500 TABLET ORAL 2 TIMES DAILY
Qty: 180 TABLET | Refills: 3 | Status: SHIPPED | OUTPATIENT
Start: 2024-04-16 | End: 2025-04-16

## 2024-04-16 NOTE — TELEPHONE ENCOUNTER
Medication:   Requested Prescriptions     Pending Prescriptions Disp Refills    levETIRAcetam (KEPPRA) 500 MG tablet 60 tablet 0     Sig: Take 1 tablet by mouth 2 times daily        Last Filled:  01/14/2024     Patient Phone Number: [phone] (home)     Last appt: 3/8/2024   Next appt: Visit date not found    Last OARRS:        No data to display

## 2024-05-28 ENCOUNTER — TELEPHONE (OUTPATIENT)
Dept: FAMILY MEDICINE CLINIC | Age: 84
End: 2024-05-28

## 2024-05-28 NOTE — TELEPHONE ENCOUNTER
----- Message from Pricilla Garcia sent at 5/28/2024 12:46 PM EDT -----  Regarding: E[facility] Appointment Request  E[facility] Appointment Request    Patient needs appointment for ECC Appointment Type: New to Provider.    Reason for Appointment Request: Requested Provider unavailable    Patient is requesting for Megan Melton MD to become his PCP for this patient is requesting to have an [place] Doctor so that he won't encounter any problems with the language.   --------------------------------------------------------------------------------------------------------------------------    Relationship to Patient: Guardian Son    Call Back Information: OK to leave message on voicemail  Preferred Call Back Number: Phone [phone]

## 2024-05-29 NOTE — TELEPHONE ENCOUNTER
PCP changed.     Left voicemail informing patient/patient's son of change as well as  situation.     Stated that they can call our office if there are any issues.     Closing encounter.

## 2024-05-30 ENCOUNTER — TELEPHONE (OUTPATIENT)
Dept: FAMILY MEDICINE CLINIC | Age: 84
End: 2024-05-30

## 2024-05-30 NOTE — TELEPHONE ENCOUNTER
NT: High /90, states patient has high BP.  Scheduled 06/04/2024.  Advised to go to Ed if gets higher or other concerns

## 2024-06-04 ENCOUNTER — OFFICE VISIT (OUTPATIENT)
Dept: FAMILY MEDICINE CLINIC | Age: 84
End: 2024-06-04
Payer: MEDICARE

## 2024-06-04 VITALS
BODY MASS INDEX: 21.28 KG/M2 | DIASTOLIC BLOOD PRESSURE: 76 MMHG | HEART RATE: 74 BPM | SYSTOLIC BLOOD PRESSURE: 140 MMHG | WEIGHT: 124 LBS | OXYGEN SATURATION: 98 %

## 2024-06-04 DIAGNOSIS — R26.81 GAIT INSTABILITY: Primary | ICD-10-CM

## 2024-06-04 DIAGNOSIS — F17.220 TOBACCO DEPENDENCE DUE TO CHEWING TOBACCO: ICD-10-CM

## 2024-06-04 DIAGNOSIS — R29.6 RECURRENT FALLS: ICD-10-CM

## 2024-06-04 DIAGNOSIS — I10 PRIMARY HYPERTENSION: ICD-10-CM

## 2024-06-04 DIAGNOSIS — Z79.01 CHRONIC ANTICOAGULATION: ICD-10-CM

## 2024-06-04 DIAGNOSIS — R26.81 GAIT INSTABILITY: ICD-10-CM

## 2024-06-04 DIAGNOSIS — K21.9 GASTROESOPHAGEAL REFLUX DISEASE WITHOUT ESOPHAGITIS: ICD-10-CM

## 2024-06-04 DIAGNOSIS — E11.59 TYPE 2 DIABETES MELLITUS WITH OTHER CIRCULATORY COMPLICATION, WITHOUT LONG-TERM CURRENT USE OF INSULIN (HCC): ICD-10-CM

## 2024-06-04 DIAGNOSIS — Z86.73 HISTORY OF CVA (CEREBROVASCULAR ACCIDENT): ICD-10-CM

## 2024-06-04 DIAGNOSIS — R45.4 IRRITABILITY: ICD-10-CM

## 2024-06-04 DIAGNOSIS — R56.9 SEIZURE (HCC): ICD-10-CM

## 2024-06-04 DIAGNOSIS — F51.01 PRIMARY INSOMNIA: ICD-10-CM

## 2024-06-04 LAB
ANION GAP SERPL CALCULATED.3IONS-SCNC: 8 MMOL/L (ref 3–16)
BASOPHILS # BLD: 0 K/UL (ref 0–0.2)
BASOPHILS NFR BLD: 0.5 %
BUN SERPL-MCNC: 11 MG/DL (ref 7–20)
CALCIUM SERPL-MCNC: 9.6 MG/DL (ref 8.3–10.6)
CHLORIDE SERPL-SCNC: 101 MMOL/L (ref 99–110)
CO2 SERPL-SCNC: 26 MMOL/L (ref 21–32)
CREAT SERPL-MCNC: 1.1 MG/DL (ref 0.8–1.3)
DEPRECATED RDW RBC AUTO: 16.3 % (ref 12.4–15.4)
EOSINOPHIL # BLD: 0 K/UL (ref 0–0.6)
EOSINOPHIL NFR BLD: 1.1 %
GFR SERPLBLD CREATININE-BSD FMLA CKD-EPI: 65 ML/MIN/{1.73_M2}
GLUCOSE SERPL-MCNC: 246 MG/DL (ref 70–99)
HCT VFR BLD AUTO: 39.8 % (ref 40.5–52.5)
HGB BLD-MCNC: 13 G/DL (ref 13.5–17.5)
LYMPHOCYTES # BLD: 0.9 K/UL (ref 1–5.1)
LYMPHOCYTES NFR BLD: 22.5 %
MCH RBC QN AUTO: 25.3 PG (ref 26–34)
MCHC RBC AUTO-ENTMCNC: 32.6 G/DL (ref 31–36)
MCV RBC AUTO: 77.8 FL (ref 80–100)
MONOCYTES # BLD: 0.6 K/UL (ref 0–1.3)
MONOCYTES NFR BLD: 14.6 %
NEUTROPHILS # BLD: 2.6 K/UL (ref 1.7–7.7)
NEUTROPHILS NFR BLD: 61.3 %
PLATELET # BLD AUTO: 116 K/UL (ref 135–450)
PMV BLD AUTO: 9.5 FL (ref 5–10.5)
POTASSIUM SERPL-SCNC: 4.3 MMOL/L (ref 3.5–5.1)
RBC # BLD AUTO: 5.13 M/UL (ref 4.2–5.9)
SODIUM SERPL-SCNC: 135 MMOL/L (ref 136–145)
WBC # BLD AUTO: 4.2 K/UL (ref 4–11)

## 2024-06-04 PROCEDURE — 1123F ACP DISCUSS/DSCN MKR DOCD: CPT | Performed by: FAMILY MEDICINE

## 2024-06-04 PROCEDURE — 99214 OFFICE O/P EST MOD 30 MIN: CPT | Performed by: FAMILY MEDICINE

## 2024-06-04 RX ORDER — BUPROPION HYDROCHLORIDE 75 MG/1
75 TABLET ORAL 2 TIMES DAILY
Qty: 60 TABLET | Refills: 3 | Status: SHIPPED | OUTPATIENT
Start: 2024-06-04

## 2024-06-04 NOTE — PROGRESS NOTES
#[id number]    
Skin is warm and dry. No rash noted. he is not diaphoretic. No erythema. No pallor.   Psychiatric: he has a normal mood and affect. his   behavior is normal.      Assessment/Plan:   1. Gait instability  - DME Order for Hospital Bed as OP  - CBC with Auto Differential; Future  - Basic Metabolic Panel; Future    2. Recurrent falls  - DME Order for Hospital Bed as OP    3. Seizure (HCC)  Currently stable on Keppra  - DME Order for Hospital Bed as OP    4. Type 2 diabetes mellitus with other circulatory complication, without long-term current use of insulin (HCC)  Diet controlled  - Hemoglobin A1C; Future    5. Tobacco dependence due to chewing tobacco  Will start Wellbutrin    6. Irritability  Started on Wellbutrin  Wean off the trazodone    7 Hypertension  Currently stable on valsartan 80 mg daily  Coreg 3.125 mg twice daily        No follow-ups on file.      Megan Melton MD  6/4/2024 3:37 PM

## 2024-06-05 DIAGNOSIS — E11.59 TYPE 2 DIABETES MELLITUS WITH OTHER CIRCULATORY COMPLICATION, WITHOUT LONG-TERM CURRENT USE OF INSULIN (HCC): Primary | ICD-10-CM

## 2024-06-05 DIAGNOSIS — E11.59 TYPE 2 DIABETES MELLITUS WITH OTHER CIRCULATORY COMPLICATION, WITHOUT LONG-TERM CURRENT USE OF INSULIN (HCC): ICD-10-CM

## 2024-06-05 RX ORDER — ATORVASTATIN CALCIUM 40 MG/1
40 TABLET, FILM COATED ORAL NIGHTLY
Qty: 90 TABLET | Refills: 3 | Status: SHIPPED | OUTPATIENT
Start: 2024-06-05

## 2024-06-05 RX ORDER — PANTOPRAZOLE SODIUM 40 MG/1
40 TABLET, DELAYED RELEASE ORAL DAILY
Qty: 90 TABLET | Refills: 3 | Status: SHIPPED | OUTPATIENT
Start: 2024-06-05

## 2024-06-05 RX ORDER — TRAZODONE HYDROCHLORIDE 100 MG/1
100 TABLET ORAL NIGHTLY
Qty: 90 TABLET | Refills: 3 | OUTPATIENT
Start: 2024-06-05

## 2024-06-05 RX ORDER — LISINOPRIL 10 MG/1
10 TABLET ORAL DAILY
Qty: 90 TABLET | Refills: 3 | OUTPATIENT
Start: 2024-06-05

## 2024-06-05 NOTE — TELEPHONE ENCOUNTER
Medication:   Requested Prescriptions     Pending Prescriptions Disp Refills    traZODone (DESYREL) 100 MG tablet [Pharmacy Med Name: TRAZODONE  MG TABS 100 Tablet] 90 tablet 3     Sig: TAKE 1 TABLET BY MOUTH NIGHTLY    atorvastatin (LIPITOR) 40 MG tablet [Pharmacy Med Name: ATORVASTATIN 40 MG TABLET 40 Tablet] 90 tablet 3     Sig: TAKE 1 TABLET BY MOUTH AT BEDTIME    pantoprazole (PROTONIX) 40 MG tablet [Pharmacy Med Name: PANTOPRAZOLE SOD DR 40 MG T 40 Tablet] 90 tablet 3     Sig: TAKE 1 TABLET BY MOUTH DAILY    lisinopril (PRINIVIL;ZESTRIL) 10 MG tablet [Pharmacy Med Name: LISINOPRIL 10 MG TABS 10 Tablet] 90 tablet 3     Sig: TAKE 1 TABLET BY MOUTH DAILY       Last Filled:  7/20/23    Patient Phone Number: [phone] (home)     Last appt: 3/8/2024   Next appt: 6/4/2024

## 2024-06-06 LAB
EST. AVERAGE GLUCOSE BLD GHB EST-MCNC: 154.2 MG/DL
HBA1C MFR BLD: 7 %

## 2024-06-25 ENCOUNTER — TELEPHONE (OUTPATIENT)
Dept: FAMILY MEDICINE CLINIC | Age: 84
End: 2024-06-25

## 2024-06-25 RX ORDER — SODIUM CHLORIDE 0.9 % (FLUSH) 0.9 %
1000 SYRINGE (ML) INJECTION
COMMUNITY
Start: 2023-09-13

## 2024-06-25 RX ORDER — ASPIRIN 81 MG/1
1 TABLET ORAL
COMMUNITY

## 2024-06-25 RX ORDER — RIVASTIGMINE TARTRATE 1.5 MG/1
1.5 CAPSULE ORAL DAILY
COMMUNITY
Start: 2024-06-20

## 2024-06-25 RX ORDER — LISINOPRIL 10 MG/1
TABLET ORAL
COMMUNITY
Start: 2024-04-02

## 2024-06-25 RX ORDER — AMLODIPINE BESYLATE 5 MG/1
1 TABLET ORAL
COMMUNITY

## 2024-06-25 NOTE — TELEPHONE ENCOUNTER
Pema stated that he wants to discontinue the [facility] because they were talking of putting the pt in hospice for care and Pema stated that they do not need additional care for the pt. Pema stated that he does not know who to speak too about his concerns and may call the company itself but not sure. Pema is also concern because he says the nurse came yesterday and is wanting to put the pt on blood thinner and Pema would like to know if that is something that should be done or not. Pema asked for someone to call him so that he can discuss all of his concerns with the Dr or MA. Pema can be contacted at [phone].

## 2024-06-25 NOTE — TELEPHONE ENCOUNTER
Please advise, thank you!     Patient Phone Number: [phone] (home)     Last appt: 6/4/2024   Next appt: 7/16/2024

## 2024-06-27 ENCOUNTER — TELEPHONE (OUTPATIENT)
Dept: FAMILY MEDICINE CLINIC | Age: 84
End: 2024-06-27

## 2024-06-27 NOTE — TELEPHONE ENCOUNTER
I have spoken to son about the care plan. He is yet to discuss with his siblings and patient and decide if he wants to enrol him in hospice care. He will call us back.  Closing the encounter

## 2024-06-27 NOTE — TELEPHONE ENCOUNTER
Pt's son Pema called back and I did ask who was trying to put his dad in hospice and stated to him that the Dr stated that he did not need a blood thinner per her note. Pema asked if he could get a call back today to discuss pt's care in more detail at [phone]. He's hoping for a call back today.

## 2024-06-27 NOTE — TELEPHONE ENCOUNTER
Pt's son Pema called back and I did ask who was trying to put his dad in hospice and stated to him that the Dr stated that he did not need a blood thinner per her note. Pema asked if he could get a call back today to discuss pt's care in more detail at [phone]. He's hoping for a call back today.

## 2024-07-09 ENCOUNTER — OFFICE VISIT (OUTPATIENT)
Dept: FAMILY MEDICINE CLINIC | Age: 84
End: 2024-07-09
Payer: MEDICARE

## 2024-07-09 VITALS
HEART RATE: 92 BPM | OXYGEN SATURATION: 99 % | TEMPERATURE: 98.6 F | BODY MASS INDEX: 19.22 KG/M2 | DIASTOLIC BLOOD PRESSURE: 76 MMHG | WEIGHT: 112 LBS | SYSTOLIC BLOOD PRESSURE: 136 MMHG

## 2024-07-09 DIAGNOSIS — R32 URINARY INCONTINENCE, UNSPECIFIED TYPE: ICD-10-CM

## 2024-07-09 DIAGNOSIS — R13.19 ESOPHAGEAL DYSPHAGIA: Primary | ICD-10-CM

## 2024-07-09 DIAGNOSIS — R47.9 DIFFICULTY SPEAKING: ICD-10-CM

## 2024-07-09 PROCEDURE — 1123F ACP DISCUSS/DSCN MKR DOCD: CPT | Performed by: FAMILY MEDICINE

## 2024-07-09 PROCEDURE — 99214 OFFICE O/P EST MOD 30 MIN: CPT | Performed by: FAMILY MEDICINE

## 2024-07-09 NOTE — PROGRESS NOTES
this visit.       Social History     Tobacco Use    Smoking status: Never    Smokeless tobacco: Current     Types: Chew   Substance Use Topics    Alcohol use: Never        Objective:     Vitals:    07/09/24 1405   BP: 136/76   Pulse: 92   Temp: 98.6 °F (37 °C)   SpO2: 99%   Weight: 50.8 kg (112 lb)     Body mass index is 19.22 kg/m².     Wt Readings from Last 3 Encounters:   07/09/24 50.8 kg (112 lb)   06/04/24 56.2 kg (124 lb)   02/28/24 55.3 kg (122 lb)     BP Readings from Last 3 Encounters:   07/09/24 136/76   06/04/24 (!) 140/76   03/08/24 124/70       Physical exam:  Constitutional: he is oriented to person, place, and time.he appears well-developed and well-nourished. No distress.   HENT:   Head: Normocephalic.   Right Ear: External ear normal. Normal TM   Left Ear: External ear normal. Normal TM  Nose: Nose normal.   Mouth/Throat: Oropharynx is clear and moist. No oropharyngeal exudate.   Neck: Normal range of motion.   Cardiovascular: Normal rate, regular rhythm, normal heart sounds  Pulmonary/Chest: Effort normal and breath sounds normal.  Abdominal: Soft. Bowel sounds are normal. he exhibits no distension and no mass.      Assessment/Plan:   1. Esophageal dysphagia  Could be due to recurrent stroke but we will get evaluated with endoscopy  - [facility] - George Graf MD, Gastroenterology, [facility]    2. Difficulty speaking  Could be due to stroke   - [facility] - Jose Castillo DO, Otolaryngology, [facility]     3 Urinary incontinence  Diapers sent to his pharmacy.      Megan Melton MD  7/9/2024 3:59 PM

## 2024-09-05 ENCOUNTER — OFFICE VISIT (OUTPATIENT)
Dept: FAMILY MEDICINE CLINIC | Age: 84
End: 2024-09-05
Payer: MEDICARE

## 2024-09-05 VITALS
HEART RATE: 74 BPM | OXYGEN SATURATION: 96 % | SYSTOLIC BLOOD PRESSURE: 109 MMHG | HEIGHT: 64 IN | BODY MASS INDEX: 19.74 KG/M2 | DIASTOLIC BLOOD PRESSURE: 68 MMHG | TEMPERATURE: 97.7 F | WEIGHT: 115.6 LBS

## 2024-09-05 DIAGNOSIS — R42 DIZZINESS: ICD-10-CM

## 2024-09-05 DIAGNOSIS — Z00.00 WELCOME TO MEDICARE PREVENTIVE VISIT: ICD-10-CM

## 2024-09-05 DIAGNOSIS — E11.59 TYPE 2 DIABETES MELLITUS WITH OTHER CIRCULATORY COMPLICATION, WITHOUT LONG-TERM CURRENT USE OF INSULIN (HCC): ICD-10-CM

## 2024-09-05 DIAGNOSIS — I10 PRIMARY HYPERTENSION: ICD-10-CM

## 2024-09-05 DIAGNOSIS — J34.89 NASAL SORE: ICD-10-CM

## 2024-09-05 DIAGNOSIS — Z00.00 ENCOUNTER FOR ANNUAL WELLNESS VISIT (AWV) IN MEDICARE PATIENT: Primary | ICD-10-CM

## 2024-09-05 DIAGNOSIS — R26.81 GAIT INSTABILITY: ICD-10-CM

## 2024-09-05 LAB — HBA1C MFR BLD: 7.2 %

## 2024-09-05 PROCEDURE — 1123F ACP DISCUSS/DSCN MKR DOCD: CPT | Performed by: FAMILY MEDICINE

## 2024-09-05 PROCEDURE — G0402 INITIAL PREVENTIVE EXAM: HCPCS | Performed by: FAMILY MEDICINE

## 2024-09-05 PROCEDURE — 83036 HEMOGLOBIN GLYCOSYLATED A1C: CPT | Performed by: FAMILY MEDICINE

## 2024-09-05 PROCEDURE — 3051F HG A1C>EQUAL 7.0%<8.0%: CPT | Performed by: FAMILY MEDICINE

## 2024-09-05 SDOH — HEALTH STABILITY: PHYSICAL HEALTH
ON AVERAGE, HOW MANY DAYS PER WEEK DO YOU ENGAGE IN MODERATE TO STRENUOUS EXERCISE (LIKE A BRISK WALK)?: PATIENT DECLINED

## 2024-09-05 SDOH — HEALTH STABILITY: PHYSICAL HEALTH: ON AVERAGE, HOW MANY MINUTES DO YOU ENGAGE IN EXERCISE AT THIS LEVEL?: 0 MIN

## 2024-09-05 ASSESSMENT — PATIENT HEALTH QUESTIONNAIRE - PHQ9
SUM OF ALL RESPONSES TO PHQ9 QUESTIONS 1 & 2: 6
6. FEELING BAD ABOUT YOURSELF - OR THAT YOU ARE A FAILURE OR HAVE LET YOURSELF OR YOUR FAMILY DOWN: NEARLY EVERY DAY
7. TROUBLE CONCENTRATING ON THINGS, SUCH AS READING THE NEWSPAPER OR WATCHING TELEVISION: NEARLY EVERY DAY
8. MOVING OR SPEAKING SO SLOWLY THAT OTHER PEOPLE COULD HAVE NOTICED. OR THE OPPOSITE, BEING SO FIGETY OR RESTLESS THAT YOU HAVE BEEN MOVING AROUND A LOT MORE THAN USUAL: NEARLY EVERY DAY
2. FEELING DOWN, DEPRESSED OR HOPELESS: NEARLY EVERY DAY
4. FEELING TIRED OR HAVING LITTLE ENERGY: NEARLY EVERY DAY
9. THOUGHTS THAT YOU WOULD BE BETTER OFF DEAD, OR OF HURTING YOURSELF: SEVERAL DAYS
SUM OF ALL RESPONSES TO PHQ QUESTIONS 1-9: 21
10. IF YOU CHECKED OFF ANY PROBLEMS, HOW DIFFICULT HAVE THESE PROBLEMS MADE IT FOR YOU TO DO YOUR WORK, TAKE CARE OF THINGS AT HOME, OR GET ALONG WITH OTHER PEOPLE: VERY DIFFICULT
3. TROUBLE FALLING OR STAYING ASLEEP: SEVERAL DAYS
SUM OF ALL RESPONSES TO PHQ QUESTIONS 1-9: 20
5. POOR APPETITE OR OVEREATING: SEVERAL DAYS
1. LITTLE INTEREST OR PLEASURE IN DOING THINGS: NEARLY EVERY DAY

## 2024-09-05 ASSESSMENT — LIFESTYLE VARIABLES
HOW OFTEN DURING THE LAST YEAR HAVE YOU FOUND THAT YOU WERE NOT ABLE TO STOP DRINKING ONCE YOU HAD STARTED: NEVER
HOW OFTEN DURING THE LAST YEAR HAVE YOU NEEDED AN ALCOHOLIC DRINK FIRST THING IN THE MORNING TO GET YOURSELF GOING AFTER A NIGHT OF HEAVY DRINKING: NEVER
HOW MANY STANDARD DRINKS CONTAINING ALCOHOL DO YOU HAVE ON A TYPICAL DAY: PATIENT DOES NOT DRINK
HOW OFTEN DURING THE LAST YEAR HAVE YOU BEEN UNABLE TO REMEMBER WHAT HAPPENED THE NIGHT BEFORE BECAUSE YOU HAD BEEN DRINKING: DAILY OR ALMOST DAILY
HOW OFTEN DURING THE LAST YEAR HAVE YOU FAILED TO DO WHAT WAS NORMALLY EXPECTED FROM YOU BECAUSE OF DRINKING: NEVER
HAS A RELATIVE, FRIEND, DOCTOR, OR ANOTHER HEALTH PROFESSIONAL EXPRESSED CONCERN ABOUT YOUR DRINKING OR SUGGESTED YOU CUT DOWN: NO
HOW MANY STANDARD DRINKS CONTAINING ALCOHOL DO YOU HAVE ON A TYPICAL DAY: 0
HAS A RELATIVE, FRIEND, DOCTOR, OR ANOTHER HEALTH PROFESSIONAL EXPRESSED CONCERN ABOUT YOUR DRINKING OR SUGGESTED YOU CUT DOWN: NO
HOW OFTEN DURING THE LAST YEAR HAVE YOU FOUND THAT YOU WERE NOT ABLE TO STOP DRINKING ONCE YOU HAD STARTED: NEVER
HOW OFTEN DO YOU HAVE SIX OR MORE DRINKS ON ONE OCCASION: 2
HAVE YOU OR SOMEONE ELSE BEEN INJURED AS A RESULT OF YOUR DRINKING: NO
HOW OFTEN DURING THE LAST YEAR HAVE YOU BEEN UNABLE TO REMEMBER WHAT HAPPENED THE NIGHT BEFORE BECAUSE YOU HAD BEEN DRINKING: DAILY OR ALMOST DAILY
HOW OFTEN DURING THE LAST YEAR HAVE YOU NEEDED AN ALCOHOLIC DRINK FIRST THING IN THE MORNING TO GET YOURSELF GOING AFTER A NIGHT OF HEAVY DRINKING: NEVER
HOW OFTEN DURING THE LAST YEAR HAVE YOU HAD A FEELING OF GUILT OR REMORSE AFTER DRINKING: NEVER
HOW OFTEN DURING THE LAST YEAR HAVE YOU HAD A FEELING OF GUILT OR REMORSE AFTER DRINKING: NEVER
HOW OFTEN DO YOU HAVE A DRINK CONTAINING ALCOHOL: NEVER
HOW OFTEN DURING THE LAST YEAR HAVE YOU FAILED TO DO WHAT WAS NORMALLY EXPECTED FROM YOU BECAUSE OF DRINKING: NEVER
HAVE YOU OR SOMEONE ELSE BEEN INJURED AS A RESULT OF YOUR DRINKING: NO
HOW OFTEN DO YOU HAVE A DRINK CONTAINING ALCOHOL: 1

## 2024-09-05 NOTE — PROGRESS NOTES
Medicare Annual Wellness Visit    Bruno Schmidt is here for Medicare AWV, Diabetes (Type 2 Diabetes F/U; last A1C= 7.0% on 6/4/24), Hypertension, Headache, and Dizziness    Assessment & Plan   Encounter for annual wellness visit (AWV) in Medicare patient  Advised to get the blood work    Type 2 diabetes mellitus with other circulatory complication, without long-term current use of insulin ([facility])  A1c 7.2.  Continue the Jardiance 25 mg daily    Dizziness  Hypotension  Advised to stop the lisinopril and discontinue the valsartan.  Not sure why he was taking both the medication    Gait instability  Recommended physical therapy    Nasal sore  Prescribed the Flonase    Primary hypertension  Continue valsartan 80 mg daily and advised to keep the blood pressure log and follow-up  Recommendations for Preventive Services Due: see orders and patient instructions/AVS.  Recommended screening schedule for the next 5-10 years is provided to the patient in written form: see Patient Instructions/AVS.     Follow-up in 3 months     Subjective     Bruno Schmidt is a 84 y.o. male with a history of recurrent strokes, bilateral carotid artery stenosis, hypertension, seizures, type 2 diabetes, congestive heart failure here for annual wellness visit.    He is very unsteady on the gait and happened to have a fall.  He hurt his nose and it has been sore to touch.  However denies any pain over his nasal bridge  He has seasonal allergies.     Hypertension: His blood pressure well-controlled with valsartan 80 mg daily.  He complains of feeling dizziness.  On further checking he has been taking lisinopril 10 mg along with valsartan.     He has history of recurrent strokes with history of bilateral carotid artery stenosis .  He is currently on Coumadin.  He is established with [facility]  He is also on Plavix 75 mg daily and Lipitor 40 mg daily  Has vascular dementia and he is on memantine 5 mg twice a day     Also has history of

## 2024-09-05 NOTE — PATIENT INSTRUCTIONS
Advance Care Planning     Advance Care Planning opens a door to talk about and write down your wishes before a sudden accident or illness.  Make your goals, values, and preferences known.     This puts you in the ’s seat and helps others know what matters most to you so they won’t have to guess.      Where can you learn more?    Go to [email or web address]-[email or web address]-[email or web address]   to learn how to:    Name someone you trust to make healthcare decisions for you, only if you can’t. (Healthcare Power of )    Document your wishes for care if you were seriously ill and not expected to recover or are approaching end of life. (Advance Directive or Living Will)    The same page can be found using the QR code below.                Preventing Falls: Care Instructions  Injuries and health problems such as trouble walking or poor eyesight can increase your risk of falling. So can some medicines. But there are things you can do to help prevent falls. You can exercise to get stronger. You can also arrange your home to make it safer.    Talk to your doctor about the medicines you take. Ask if any of them increase the risk of falls and whether they can be changed or stopped.   Try to exercise regularly. It can help improve your strength and balance. This can help lower your risk of falling.         Practice fall safety and prevention.   Wear low-heeled shoes that fit well and give your feet good support. Talk to your doctor if you have foot problems that make this hard.  Carry a cellphone or wear a medical alert device that you can use to call for help.  Use stepladders instead of chairs to reach high objects. Don't climb if you're at risk for falls. Ask for help, if needed.  Wear the correct eyeglasses, if you need them.        Make your home safer.   Remove rugs, cords, clutter, and furniture from walkways.  Keep your house well lit. Use night-lights in hallways and bathrooms.  Install and use sturdy

## 2024-09-12 RX ORDER — CARVEDILOL 3.12 MG/1
3.12 TABLET ORAL 2 TIMES DAILY
Qty: 100 TABLET | Refills: 3 | Status: SHIPPED | OUTPATIENT
Start: 2024-09-12

## 2024-09-25 DIAGNOSIS — Z86.73 HISTORY OF CVA (CEREBROVASCULAR ACCIDENT): ICD-10-CM

## 2024-09-25 DIAGNOSIS — F51.01 PRIMARY INSOMNIA: ICD-10-CM

## 2024-09-26 RX ORDER — TRAZODONE HYDROCHLORIDE 100 MG/1
100 TABLET ORAL NIGHTLY
Qty: 90 TABLET | Refills: 3 | Status: SHIPPED | OUTPATIENT
Start: 2024-09-26

## 2024-09-26 RX ORDER — CLOPIDOGREL BISULFATE 75 MG/1
75 TABLET ORAL DAILY
Qty: 90 TABLET | Refills: 3 | Status: SHIPPED | OUTPATIENT
Start: 2024-09-26

## 2024-09-26 RX ORDER — LISINOPRIL 10 MG/1
10 TABLET ORAL DAILY
Qty: 90 TABLET | Refills: 3 | OUTPATIENT
Start: 2024-09-26

## 2024-12-02 ENCOUNTER — OFFICE VISIT (OUTPATIENT)
Dept: FAMILY MEDICINE CLINIC | Age: 84
End: 2024-12-02

## 2024-12-02 VITALS
SYSTOLIC BLOOD PRESSURE: 93 MMHG | OXYGEN SATURATION: 96 % | WEIGHT: 116 LBS | TEMPERATURE: 97.3 F | HEIGHT: 64 IN | DIASTOLIC BLOOD PRESSURE: 60 MMHG | HEART RATE: 79 BPM | BODY MASS INDEX: 19.81 KG/M2

## 2024-12-02 DIAGNOSIS — F51.01 PRIMARY INSOMNIA: Primary | ICD-10-CM

## 2024-12-02 DIAGNOSIS — R41.0 CONFUSION: ICD-10-CM

## 2024-12-02 DIAGNOSIS — R45.4 IRRITABLE BEHAVIOR: ICD-10-CM

## 2024-12-02 NOTE — PROGRESS NOTES
Comprehensive Metabolic Panel; Future  - Urinalysis with Microscopic    3. Confusion  Currently oriented.  Low blood pressure.  Will check for the blood work and urine.  Has history of TIA.  Currently neurological exam stable.  - Culture, Urine    4.  Hypertension  Currently low  Hold off on amlodipine  Keep the blood pressure log       No follow-ups on file.    Megan Melton MD  12/3/2024 6:10 AM

## 2024-12-03 DIAGNOSIS — R45.4 IRRITABLE BEHAVIOR: ICD-10-CM

## 2024-12-04 LAB
ALBUMIN SERPL-MCNC: 3.9 G/DL (ref 3.4–5)
ALBUMIN/GLOB SERPL: 1.3 {RATIO} (ref 1.1–2.2)
ALP SERPL-CCNC: 87 U/L (ref 40–129)
ALT SERPL-CCNC: 19 U/L (ref 10–40)
ANION GAP SERPL CALCULATED.3IONS-SCNC: 9 MMOL/L (ref 3–16)
AST SERPL-CCNC: 21 U/L (ref 15–37)
BASOPHILS # BLD: 0 K/UL (ref 0–0.2)
BASOPHILS NFR BLD: 0.8 %
BILIRUB SERPL-MCNC: 0.7 MG/DL (ref 0–1)
BUN SERPL-MCNC: 17 MG/DL (ref 7–20)
CALCIUM SERPL-MCNC: 9.6 MG/DL (ref 8.3–10.6)
CHLORIDE SERPL-SCNC: 105 MMOL/L (ref 99–110)
CO2 SERPL-SCNC: 25 MMOL/L (ref 21–32)
CREAT SERPL-MCNC: 1.1 MG/DL (ref 0.8–1.3)
DEPRECATED RDW RBC AUTO: 15.8 % (ref 12.4–15.4)
EOSINOPHIL # BLD: 0.1 K/UL (ref 0–0.6)
EOSINOPHIL NFR BLD: 3 %
GFR SERPLBLD CREATININE-BSD FMLA CKD-EPI: 65 ML/MIN/{1.73_M2}
GLUCOSE SERPL-MCNC: 221 MG/DL (ref 70–99)
HCT VFR BLD AUTO: 40.9 % (ref 40.5–52.5)
HGB BLD-MCNC: 13.2 G/DL (ref 13.5–17.5)
LYMPHOCYTES # BLD: 0.9 K/UL (ref 1–5.1)
LYMPHOCYTES NFR BLD: 25.7 %
MCH RBC QN AUTO: 26.8 PG (ref 26–34)
MCHC RBC AUTO-ENTMCNC: 32.3 G/DL (ref 31–36)
MCV RBC AUTO: 83 FL (ref 80–100)
MONOCYTES # BLD: 0.4 K/UL (ref 0–1.3)
MONOCYTES NFR BLD: 11.7 %
NEUTROPHILS # BLD: 2.1 K/UL (ref 1.7–7.7)
NEUTROPHILS NFR BLD: 58.8 %
PLATELET # BLD AUTO: 124 K/UL (ref 135–450)
PMV BLD AUTO: 10.1 FL (ref 5–10.5)
POTASSIUM SERPL-SCNC: 4 MMOL/L (ref 3.5–5.1)
PROT SERPL-MCNC: 6.9 G/DL (ref 6.4–8.2)
RBC # BLD AUTO: 4.92 M/UL (ref 4.2–5.9)
SODIUM SERPL-SCNC: 139 MMOL/L (ref 136–145)
WBC # BLD AUTO: 3.6 K/UL (ref 4–11)

## 2024-12-05 DIAGNOSIS — E11.59 TYPE 2 DIABETES MELLITUS WITH OTHER CIRCULATORY COMPLICATION, WITHOUT LONG-TERM CURRENT USE OF INSULIN (HCC): Primary | ICD-10-CM

## 2024-12-05 DIAGNOSIS — E11.59 TYPE 2 DIABETES MELLITUS WITH OTHER CIRCULATORY COMPLICATION, WITHOUT LONG-TERM CURRENT USE OF INSULIN (HCC): ICD-10-CM

## 2024-12-06 LAB
EST. AVERAGE GLUCOSE BLD GHB EST-MCNC: 162.8 MG/DL
HBA1C MFR BLD: 7.3 %

## 2024-12-06 RX ORDER — EMPAGLIFLOZIN 25 MG/1
25 TABLET, FILM COATED ORAL DAILY
Qty: 30 TABLET | Refills: 1 | Status: SHIPPED | OUTPATIENT
Start: 2024-12-06

## 2024-12-06 NOTE — TELEPHONE ENCOUNTER
Medication:   Requested Prescriptions     Pending Prescriptions Disp Refills    JARDIANCE 25 MG tablet [Pharmacy Med Name: JARDIANCE 25 MG TABLET 25 Tablet] 30 tablet 1     Sig: TAKE 1 TABLET BY MOUTH DAILY       Last Filled:  06/06/2024 #90 1rf     Patient Phone Number: [phone] (home)     Last appt: 12/2/2024   Next appt: Visit date not found    Last Labs DM:   Lab Results   Component Value Date/Time    LABA1C 7.3 12/03/2024 12:02 PM

## 2024-12-09 DIAGNOSIS — I10 PRIMARY HYPERTENSION: ICD-10-CM

## 2024-12-09 RX ORDER — VALSARTAN 80 MG/1
80 TABLET ORAL DAILY
Qty: 30 TABLET | Refills: 11 | Status: SHIPPED | OUTPATIENT
Start: 2024-12-09

## 2024-12-09 NOTE — TELEPHONE ENCOUNTER
Medication:   Requested Prescriptions     Pending Prescriptions Disp Refills    valsartan (DIOVAN) 80 MG tablet 30 tablet 11     Sig: Take 1 tablet by mouth daily        Last Filled:  12/06/2023 #30 11rf    Patient Phone Number: [phone] (home)     Last appt: 12/2/2024   Next appt: Visit date not found    Last OARRS:        No data to display

## 2024-12-10 RX ORDER — VALSARTAN 80 MG/1
80 TABLET ORAL DAILY
Qty: 30 TABLET | Refills: 11 | OUTPATIENT
Start: 2024-12-10

## 2024-12-10 NOTE — TELEPHONE ENCOUNTER
Medication:   Requested Prescriptions     Pending Prescriptions Disp Refills    valsartan (DIOVAN) 80 MG tablet [Pharmacy Med Name: VALSARTAN 80 MG TABLET 80 Tablet] 30 tablet 11     Sig: TAKE 1 TABLET BY MOUTH DAILY        Last Filled:  12/09/2024    Duplicate request     Patient Phone Number: [phone] (home)     Last appt: 12/2/2024   Next appt: 12/9/2024    Last OARRS:        No data to display

## 2024-12-12 ENCOUNTER — OFFICE VISIT (OUTPATIENT)
Dept: FAMILY MEDICINE CLINIC | Age: 84
End: 2024-12-12

## 2024-12-12 VITALS
OXYGEN SATURATION: 99 % | TEMPERATURE: 97.4 F | SYSTOLIC BLOOD PRESSURE: 162 MMHG | HEIGHT: 64 IN | BODY MASS INDEX: 19.84 KG/M2 | WEIGHT: 116.2 LBS | DIASTOLIC BLOOD PRESSURE: 102 MMHG | HEART RATE: 62 BPM

## 2024-12-12 DIAGNOSIS — E11.59 TYPE 2 DIABETES MELLITUS WITH OTHER CIRCULATORY COMPLICATION, WITHOUT LONG-TERM CURRENT USE OF INSULIN (HCC): ICD-10-CM

## 2024-12-12 DIAGNOSIS — I10 PRIMARY HYPERTENSION: Primary | ICD-10-CM

## 2024-12-12 RX ORDER — AMLODIPINE BESYLATE 5 MG/1
5 TABLET ORAL DAILY
COMMUNITY
Start: 2024-12-12

## 2024-12-12 NOTE — PROGRESS NOTES
Chief Complaint: Diabetes (Type 2 Diabetes F/U; last A1C= 7.3% on 12/3/24), Discuss Labs, and Hypertension       HPI: Bruno Schmidt is a 84 y.o. male with a history of recurrent strokes, bilateral carotid artery stenosis, hypertension, seizures, type 2 diabetes, congestive heart failure here with his son who translates and was to discuss the labs done recently.    Hypertension: His blood pressure was low and he was taking Coreg 3.125 mg daily and valsartan 80 mg daily.  We held off the amlodipine.  Since the discontinuation of the medication his blood pressure has been elevated.    His BGM is high and A1c is 7.5.  Currently taking Jardiance 25 mg daily.  Due to previous elevated creatinine discontinue the metformin.  All other labs were normal.     He has history of recurrent strokes with history of bilateral carotid artery stenosis .  He is currently on Coumadin.  He is established with [facility]  He is also on Plavix 75 mg daily and Lipitor 40 mg daily  Has vascular dementia and he is on memantine 5 mg twice a day     He was recently diagnosed of Parkinson's and was started on Sinemet 25/100 mg daily and also on Exelon.    Also has history of seizures currently on Keppra 500 mg twice a day.     He has been taking Wellbutrin 75 mg twice a day    His confusion is better.    ROS:  Constitutional: Negative for appetite change, fatigue, fever and unexpected weight change.   HENT: Negative  Respiratory: Negative   Cardiovascular: Negative  Gastrointestinal: Negative   Genitourinary: Negative   Musculoskeletal: Negative    Patient's problem list, medications, allergies, past medical, surgical, social and family histories were reviewed and updated as appropriate.     Current Outpatient Medications   Medication Sig Dispense Refill    SITagliptin (JANUVIA) 100 MG tablet Take 0.5 tablets by mouth daily 90 tablet 1    amLODIPine (NORVASC) 5 MG tablet Take 1 tablet by mouth daily      valsartan (DIOVAN) 80 MG

## 2024-12-12 NOTE — TELEPHONE ENCOUNTER
Patient requesting refill of...carbidopa-levodopa (SINEMET)  MG per tablet [[id number]]    Order Details  Dose, Route, Frequency: As Directed   Dispense Quantity: -- Refills: --          Si/2 BID for 15 days  call us if helping to raise dosage for shuffling gait   memantine (NAMENDA) 5 MG tablet [[id number]]    Order Details    Dose: 5 mg Route: Oral Frequency: 2 TIMES DAILY   Dispense Quantity: -- Refills: --          Sig: Take 1 tablet by mouth 2 times daily             Last visit date: 2024    Pharmacy...[facility] - [place], [place] - [address] - P [phone] - F [phone]

## 2024-12-13 RX ORDER — CARBIDOPA AND LEVODOPA 25; 100 MG/1; MG/1
1 TABLET ORAL 2 TIMES DAILY
Qty: 90 TABLET | Refills: 0 | Status: SHIPPED | OUTPATIENT
Start: 2024-12-13

## 2024-12-13 RX ORDER — BUPROPION HYDROCHLORIDE 75 MG/1
75 TABLET ORAL 2 TIMES DAILY
Qty: 60 TABLET | Refills: 3 | Status: SHIPPED | OUTPATIENT
Start: 2024-12-13

## 2024-12-13 NOTE — TELEPHONE ENCOUNTER
Medication:   Requested Prescriptions     Pending Prescriptions Disp Refills    buPROPion (WELLBUTRIN) 75 MG tablet 60 tablet 3     Sig: Take 1 tablet by mouth 2 times daily    carbidopa-levodopa (SINEMET)  MG per tablet 90 tablet         Last Filled:      Patient Phone Number: [phone] (home)     Last appt: 12/12/2024   Next appt: Visit date not found    Last OARRS:        No data to display

## 2024-12-18 ENCOUNTER — OFFICE VISIT (OUTPATIENT)
Dept: FAMILY MEDICINE CLINIC | Age: 84
End: 2024-12-18

## 2024-12-18 VITALS
WEIGHT: 117 LBS | HEART RATE: 76 BPM | HEIGHT: 64 IN | SYSTOLIC BLOOD PRESSURE: 110 MMHG | OXYGEN SATURATION: 100 % | BODY MASS INDEX: 19.97 KG/M2 | TEMPERATURE: 97.2 F | DIASTOLIC BLOOD PRESSURE: 70 MMHG

## 2024-12-18 DIAGNOSIS — I10 PRIMARY HYPERTENSION: ICD-10-CM

## 2024-12-18 DIAGNOSIS — I62.9 INTRACRANIAL HEMORRHAGE (HCC): Primary | ICD-10-CM

## 2024-12-18 NOTE — PROGRESS NOTES
Chief Complaint: Follow-Up from Hospital (12/14/2024-12/15/2024 at [facility] Emergency Department for ICH & Altered mental status)       HPI:Bruno Schmidt is a 84 y.o. male with a history of recurrent strokes, bilateral carotid artery stenosis, hypertension, seizures, type 2 diabetes, congestive heart failure was in the hospital 12/14/2024-12/15/2024.  He was taken by his son as his blood pressure was high.  He was found to have 4 mm left thalamic/thalamocapsular region hyperdensity suspicious for intraparenchymal hemorrhage in the setting of therapeutic INR while on coumadin.  He was told to hold off Plavix and Coumadin.      He had MRI which did show Small intraparenchymal hematoma within the left thalamus with mild surrounding vasogenic edema. No significant mass effect. No evidence of acute infarct in this region.     Hypertension: His blood pressure was low and he was taking Coreg 3.125 mg daily  He was told to hold off other blood pressure medication.  Currently not taking valsartan or amlodipine.     His BGM is high and A1c is 7.5.  Currently taking Jardiance 25 mg daily.  Due to previous elevated creatinine discontinue the metformin.       Has vascular dementia and he is on memantine 5 mg twice a day     He was recently diagnosed of Parkinson's and was started on Sinemet 25/100 mg daily and also on Exelon.     Also has history of seizures currently on Keppra 500 mg twice a day.     He has been taking Wellbutrin 75 mg twice a day.  Currently it has been held.    He has cardiology appointment tomorrow to know whether to hold after Coumadin or not.     He is not confused currently and able to communicate.  Denies any focal weakness.       ROS:  Constitutional: Negative    Respiratory: Negative   Cardiovascular: Negative   Gastrointestinal: Negative   Genitourinary: Negative   Neurological: As mentioned above  Patient's problem list, medications, allergies, past medical, surgical, social and family

## 2024-12-20 RX ORDER — AMLODIPINE BESYLATE 5 MG/1
5 TABLET ORAL DAILY
Qty: 90 TABLET | Refills: 3 | Status: SHIPPED | OUTPATIENT
Start: 2024-12-20

## 2024-12-20 NOTE — TELEPHONE ENCOUNTER
Medication:   Requested Prescriptions     Pending Prescriptions Disp Refills    SITagliptin (JANUVIA) 100 MG tablet 90 tablet 1     Sig: Take 0.5 tablets by mouth daily       Last Filled:  12/12/2024 #90 1rf    Duplicate request     Patient Phone Number: [phone] (home)     Last appt: 12/18/2024   Next appt: Visit date not found    Last Labs DM:   Lab Results   Component Value Date/Time    LABA1C 7.3 12/03/2024 12:02 PM

## 2024-12-20 NOTE — TELEPHONE ENCOUNTER
Medication:   Requested Prescriptions     Pending Prescriptions Disp Refills    amLODIPine (NORVASC) 5 MG tablet [Pharmacy Med Name: AMLODIPINE BESYLATE 5 MG TA 5 Tablet] 90 tablet 3     Sig: TAKE 1 TABLET BY MOUTH DAILY     Last Filled:  12/12/24    Last appt: 12/18/2024   Next appt: 12/19/2024    Last OARRS:        No data to display

## 2024-12-26 ENCOUNTER — PATIENT MESSAGE (OUTPATIENT)
Dept: FAMILY MEDICINE CLINIC | Age: 84
End: 2024-12-26

## 2024-12-26 DIAGNOSIS — Z86.73 HISTORY OF CVA (CEREBROVASCULAR ACCIDENT): Primary | ICD-10-CM

## 2024-12-27 NOTE — TELEPHONE ENCOUNTER
Referral has been faxed to:     Referral Details       Referred By   Referred To    Megan Melton MD   [address]   [address]   [facility] [address]   Phone: [phone]   Fax: [phone]    Diagnoses: History of CVA (cerebrovascular accident)   Order: [facility] [place]   Reason: Specialty Services Required    [facility]   [address]   [facility] [address]   Phone: [phone]   Fax: [phone]

## 2025-01-07 ENCOUNTER — TELEMEDICINE (OUTPATIENT)
Dept: FAMILY MEDICINE CLINIC | Age: 85
End: 2025-01-07

## 2025-01-07 DIAGNOSIS — I50.32 CHRONIC DIASTOLIC CHF (CONGESTIVE HEART FAILURE) (HCC): ICD-10-CM

## 2025-01-07 DIAGNOSIS — R56.9 SEIZURE (HCC): ICD-10-CM

## 2025-01-07 DIAGNOSIS — E11.59 TYPE 2 DIABETES MELLITUS WITH OTHER CIRCULATORY COMPLICATION, WITHOUT LONG-TERM CURRENT USE OF INSULIN (HCC): ICD-10-CM

## 2025-01-07 DIAGNOSIS — F51.01 PRIMARY INSOMNIA: Primary | ICD-10-CM

## 2025-01-07 DIAGNOSIS — I10 PRIMARY HYPERTENSION: ICD-10-CM

## 2025-01-07 RX ORDER — HYDROXYZINE HYDROCHLORIDE 25 MG/1
25 TABLET, FILM COATED ORAL EVERY 8 HOURS PRN
Qty: 30 TABLET | Refills: 0 | Status: SHIPPED | OUTPATIENT
Start: 2025-01-07 | End: 2025-02-06

## 2025-01-07 ASSESSMENT — PATIENT HEALTH QUESTIONNAIRE - PHQ9
5. POOR APPETITE OR OVEREATING: NOT AT ALL
SUM OF ALL RESPONSES TO PHQ9 QUESTIONS 1 & 2: 1
SUM OF ALL RESPONSES TO PHQ QUESTIONS 1-9: 1
3. TROUBLE FALLING OR STAYING ASLEEP: NOT AT ALL
SUM OF ALL RESPONSES TO PHQ QUESTIONS 1-9: 1
SUM OF ALL RESPONSES TO PHQ QUESTIONS 1-9: 1
8. MOVING OR SPEAKING SO SLOWLY THAT OTHER PEOPLE COULD HAVE NOTICED. OR THE OPPOSITE, BEING SO FIGETY OR RESTLESS THAT YOU HAVE BEEN MOVING AROUND A LOT MORE THAN USUAL: NOT AT ALL
4. FEELING TIRED OR HAVING LITTLE ENERGY: NOT AT ALL
6. FEELING BAD ABOUT YOURSELF - OR THAT YOU ARE A FAILURE OR HAVE LET YOURSELF OR YOUR FAMILY DOWN: NOT AT ALL
1. LITTLE INTEREST OR PLEASURE IN DOING THINGS: NOT AT ALL
SUM OF ALL RESPONSES TO PHQ QUESTIONS 1-9: 1
7. TROUBLE CONCENTRATING ON THINGS, SUCH AS READING THE NEWSPAPER OR WATCHING TELEVISION: NOT AT ALL
2. FEELING DOWN, DEPRESSED OR HOPELESS: SEVERAL DAYS
9. THOUGHTS THAT YOU WOULD BE BETTER OFF DEAD, OR OF HURTING YOURSELF: NOT AT ALL
10. IF YOU CHECKED OFF ANY PROBLEMS, HOW DIFFICULT HAVE THESE PROBLEMS MADE IT FOR YOU TO DO YOUR WORK, TAKE CARE OF THINGS AT HOME, OR GET ALONG WITH OTHER PEOPLE: NOT DIFFICULT AT ALL

## 2025-01-07 NOTE — PROGRESS NOTES
2025    TELEHEALTH EVALUATION -- Audio/Visual     HPI:    Bruno Schmidt (:  1940) has requested an audio/video evaluation for the following concern(s): He has been not able to sleep at nighttime and his blood pressure fluctuating.    He has history of recurrent strokes, bilateral carotid artery stenosis, hypertension, seizures, type 2 diabetes, congestive heart failure was in the hospital 2024-12/15/2024.    He was found to have 4 mm left thalamic/thalamocapsular region hyperdensity suspicious for intraparenchymal hemorrhage in the setting of therapeutic INR while on coumadin.  Currently has been discontinued Plavix and Coumadin.    His blood pressure has been fluctuating.  Currently taking amlodipine 5 mg daily.  Today his blood pressure is 130 over 80s.  Denies any dizziness.     A1c is 7.5.  Currently taking Jardiance 25 mg daily.       Has vascular dementia and he is on memantine 5 mg twice a day     He was recently diagnosed of Parkinson's and was started on Sinemet 25/100 mg daily and also on Exelon.     Also has history of seizures currently on Keppra 500 mg twice a day.       He was on Wellbutrin which was discontinued.  His appetite is low and has been struggling to fall asleep or stay asleep.  At times is frustrated.      Review of Systems:  Gen:  Denies fever, chills, headaches. No weight loss  HEENT:  Denies cold symptoms, sore throat.  CV:  Denies chest pain or tightness, palpitations.  Pulm:  Denies shortness of breath, cough.  Abd:  Denies abdominal pain, change in bowel habits.    Prior to Visit Medications    Medication Sig Taking? Authorizing Provider   hydrOXYzine HCl (ATARAX) 25 MG tablet Take 1 tablet by mouth every 8 hours as needed for Anxiety Yes Megan Melton MD   amLODIPine (NORVASC) 5 MG tablet TAKE 1 TABLET BY MOUTH DAILY Yes Megan Melton MD   carbidopa-levodopa (SINEMET)  MG per tablet Take 1 tablet by mouth in the morning and at bedtime Yes G[place]

## 2025-01-22 DIAGNOSIS — F51.01 PRIMARY INSOMNIA: ICD-10-CM

## 2025-01-22 RX ORDER — HYDROXYZINE HYDROCHLORIDE 25 MG/1
25 TABLET, FILM COATED ORAL EVERY 8 HOURS PRN
Qty: 30 TABLET | Refills: 0 | Status: SHIPPED | OUTPATIENT
Start: 2025-01-22 | End: 2025-02-21

## 2025-01-22 NOTE — TELEPHONE ENCOUNTER
Medication:   Requested Prescriptions     Pending Prescriptions Disp Refills    hydrOXYzine HCl (ATARAX) 25 MG tablet [Pharmacy Med Name: HYDROXYZINE HCL 25 MG TABS 25 Tablet] 30 tablet 0     Sig: TAKE 1 TABLET BY MOUTH EVERY 8 HOURS AS NEEDED FOR ANXIETY        Last Filled:  01/07/2025 #30 0rf     Patient Phone Number: [phone] (home)     Last appt: 1/7/2025   Next appt: Visit date not found    Last OARRS:        No data to display

## 2025-01-28 NOTE — TELEPHONE ENCOUNTER
Medication:   Requested Prescriptions     Pending Prescriptions Disp Refills    carbidopa-levodopa (SINEMET)  MG per tablet [Pharmacy Med Name: CARBIDOPA-LEVODOPA  M  Tablet] 90 tablet 0     Sig: TAKE 1 TABLET BY MOUTH IN THE MORNING AND AT BEDTIME     Last Filled:  12/13/24    Last appt: 1/7/2025   Next appt: Visit date not found    Last OARRS:        No data to display

## 2025-01-29 RX ORDER — CARBIDOPA AND LEVODOPA 25; 100 MG/1; MG/1
1 TABLET ORAL 2 TIMES DAILY
Qty: 90 TABLET | Refills: 0 | Status: SHIPPED | OUTPATIENT
Start: 2025-01-29

## 2025-02-18 ENCOUNTER — PATIENT MESSAGE (OUTPATIENT)
Dept: FAMILY MEDICINE CLINIC | Age: 85
End: 2025-02-18

## 2025-02-19 RX ORDER — LABETALOL HYDROCHLORIDE 5 MG/ML
1 INJECTION, SOLUTION INTRAVENOUS DAILY
Qty: 1 EACH | Refills: 5 | Status: SHIPPED | OUTPATIENT
Start: 2025-02-19

## 2025-02-19 NOTE — TELEPHONE ENCOUNTER
Please advise, thank you!     Medication:   Requested Prescriptions     Pending Prescriptions Disp Refills    [employer] Products (HUGGIES SIMPLY CLEAN WIPES) MISC 1 each 5     Si each by Does not apply route daily        Patient Phone Number: [phone] (home)     Last appt: 2025   Next appt: Visit date not found    Last OARRS:        No data to display

## 2025-03-03 DIAGNOSIS — R56.9 SEIZURE (HCC): ICD-10-CM

## 2025-03-03 RX ORDER — LEVETIRACETAM 500 MG/1
500 TABLET ORAL 2 TIMES DAILY
Qty: 60 TABLET | Refills: 3 | Status: SHIPPED | OUTPATIENT
Start: 2025-03-03 | End: 2026-03-03

## 2025-03-03 NOTE — TELEPHONE ENCOUNTER
Medication:   Requested Prescriptions     Pending Prescriptions Disp Refills    levETIRAcetam (KEPPRA) 500 MG tablet [Pharmacy Med Name: LEVETIRACETAM 500 MG TABLET 500 Tablet] 60 tablet 3     Sig: TAKE 1 TABLET BY MOUTH 2 TIMES DAILY        Last Filled:  04/16/2024 #180 3rf    Patient Phone Number: [phone] (home)     Last appt: 1/7/2025   Next appt: Visit date not found    Last OARRS:        No data to display

## 2025-03-24 DIAGNOSIS — Z86.73 HISTORY OF CVA (CEREBROVASCULAR ACCIDENT): ICD-10-CM

## 2025-03-24 DIAGNOSIS — K21.9 GASTROESOPHAGEAL REFLUX DISEASE WITHOUT ESOPHAGITIS: ICD-10-CM

## 2025-03-24 RX ORDER — PANTOPRAZOLE SODIUM 40 MG/1
40 TABLET, DELAYED RELEASE ORAL DAILY
Qty: 60 TABLET | Refills: 3 | Status: SHIPPED | OUTPATIENT
Start: 2025-03-24

## 2025-03-24 RX ORDER — ATORVASTATIN CALCIUM 40 MG/1
40 TABLET, FILM COATED ORAL NIGHTLY
Qty: 60 TABLET | Refills: 3 | Status: SHIPPED | OUTPATIENT
Start: 2025-03-24

## 2025-03-24 NOTE — TELEPHONE ENCOUNTER
Medication:   Requested Prescriptions     Pending Prescriptions Disp Refills    pantoprazole (PROTONIX) 40 MG tablet [Pharmacy Med Name: PANTOPRAZOLE SOD DR 40 MG T 40 Tablet] 60 tablet 3     Sig: TAKE 1 TABLET BY MOUTH DAILY    atorvastatin (LIPITOR) 40 MG tablet [Pharmacy Med Name: ATORVASTATIN 40 MG TABLET 40 Tablet] 60 tablet 3     Sig: TAKE 1 TABLET BY MOUTH AT BEDTIME       Last Filled:  6/05/2024 90 tablet, Refills: 3 ordered       Patient Phone Number: [phone] (home)     Last appt: 1/7/2025   Next appt: 9/8/2025    Last Labs DM:   Lab Results   Component Value Date/Time    LABA1C 7.3 12/03/2024 12:02 PM     Last Lipid:   Lab Results   Component Value Date/Time    CHOL 98 08/25/2023 11:29 AM    TRIG 99 08/25/2023 11:29 AM    HDL 44 08/25/2023 11:29 AM     Last PSA: No results found for: \"PSA\"  Last Thyroid: No results found for: \"TSH\", \"FT3\", \"E2NZTWK\", \"T4FREE\"       Right 3rd fingertip crush and nail injury, able to flex tip of finger, sensation intact and normal.

## 2025-04-02 NOTE — TELEPHONE ENCOUNTER
Medication:   Requested Prescriptions     Pending Prescriptions Disp Refills    SITagliptin (JANUVIA) 100 MG tablet 90 tablet 1     Sig: Take 0.5 tablets by mouth daily       Last Filled:  12/12/2024 #90 1rf    Patient Phone Number: [phone] (home)     Last appt: 1/7/2025   Next appt: 9/8/2025    Last Labs DM:   Lab Results   Component Value Date/Time    LABA1C 7.3 12/03/2024 12:02 PM

## 2025-04-23 RX ORDER — CARVEDILOL 3.12 MG/1
3.12 TABLET ORAL 2 TIMES DAILY
Qty: 100 TABLET | Refills: 4 | Status: SHIPPED | OUTPATIENT
Start: 2025-04-23

## 2025-04-23 NOTE — TELEPHONE ENCOUNTER
Medication:   Requested Prescriptions     Pending Prescriptions Disp Refills    carvedilol (COREG) 3.125 MG tablet [Pharmacy Med Name: CARVEDILOL 3.125 MG TABLET 3.125 Tablet] 100 tablet 4     Sig: TAKE 1 TABLET BY MOUTH 2 TIMES DAILY        Last Filled:  09/12/2024 #100 3rf    Patient Phone Number: [phone] (home)     Last appt: 1/7/2025   Next appt: 9/8/2025    Last OARRS:        No data to display

## 2025-05-06 ENCOUNTER — OFFICE VISIT (OUTPATIENT)
Dept: FAMILY MEDICINE CLINIC | Age: 85
End: 2025-05-06
Payer: MEDICARE

## 2025-05-06 ENCOUNTER — PATIENT MESSAGE (OUTPATIENT)
Dept: FAMILY MEDICINE CLINIC | Age: 85
End: 2025-05-06

## 2025-05-06 VITALS
DIASTOLIC BLOOD PRESSURE: 71 MMHG | TEMPERATURE: 97.9 F | WEIGHT: 116.4 LBS | HEART RATE: 62 BPM | HEIGHT: 64 IN | BODY MASS INDEX: 19.87 KG/M2 | OXYGEN SATURATION: 96 % | SYSTOLIC BLOOD PRESSURE: 117 MMHG

## 2025-05-06 DIAGNOSIS — Z00.00 INITIAL MEDICARE ANNUAL WELLNESS VISIT: ICD-10-CM

## 2025-05-06 DIAGNOSIS — E11.59 TYPE 2 DIABETES MELLITUS WITH OTHER CIRCULATORY COMPLICATION, WITHOUT LONG-TERM CURRENT USE OF INSULIN (HCC): ICD-10-CM

## 2025-05-06 DIAGNOSIS — F51.01 PRIMARY INSOMNIA: ICD-10-CM

## 2025-05-06 DIAGNOSIS — I10 PRIMARY HYPERTENSION: ICD-10-CM

## 2025-05-06 DIAGNOSIS — J06.9 PROTRACTED URI: ICD-10-CM

## 2025-05-06 DIAGNOSIS — F33.1 MODERATE EPISODE OF RECURRENT MAJOR DEPRESSIVE DISORDER (HCC): ICD-10-CM

## 2025-05-06 DIAGNOSIS — I50.32 CHRONIC DIASTOLIC CHF (CONGESTIVE HEART FAILURE) (HCC): ICD-10-CM

## 2025-05-06 DIAGNOSIS — E11.59 TYPE 2 DIABETES MELLITUS WITH OTHER CIRCULATORY COMPLICATION, WITHOUT LONG-TERM CURRENT USE OF INSULIN (HCC): Primary | ICD-10-CM

## 2025-05-06 DIAGNOSIS — Z00.00 ENCOUNTER FOR SUBSEQUENT ANNUAL WELLNESS VISIT (AWV) IN MEDICARE PATIENT: ICD-10-CM

## 2025-05-06 LAB
ALBUMIN SERPL-MCNC: 3.8 G/DL (ref 3.4–5)
ALBUMIN/GLOB SERPL: 1.2 {RATIO} (ref 1.1–2.2)
ALP SERPL-CCNC: 109 U/L (ref 40–129)
ALT SERPL-CCNC: 13 U/L (ref 10–40)
ANION GAP SERPL CALCULATED.3IONS-SCNC: 9 MMOL/L (ref 3–16)
AST SERPL-CCNC: 25 U/L (ref 15–37)
BASOPHILS # BLD: 0 K/UL (ref 0–0.2)
BASOPHILS NFR BLD: 0.7 %
BILIRUB SERPL-MCNC: 0.8 MG/DL (ref 0–1)
BUN SERPL-MCNC: 12 MG/DL (ref 7–20)
CALCIUM SERPL-MCNC: 9.4 MG/DL (ref 8.3–10.6)
CHLORIDE SERPL-SCNC: 103 MMOL/L (ref 99–110)
CO2 SERPL-SCNC: 29 MMOL/L (ref 21–32)
CREAT SERPL-MCNC: 1.2 MG/DL (ref 0.8–1.3)
DEPRECATED RDW RBC AUTO: 16.1 % (ref 12.4–15.4)
EOSINOPHIL # BLD: 0.1 K/UL (ref 0–0.6)
EOSINOPHIL NFR BLD: 2.1 %
GFR SERPLBLD CREATININE-BSD FMLA CKD-EPI: 58 ML/MIN/{1.73_M2}
GLUCOSE SERPL-MCNC: 97 MG/DL (ref 70–99)
HBA1C MFR BLD: 7.4 %
HCT VFR BLD AUTO: 42.5 % (ref 40.5–52.5)
HGB BLD-MCNC: 14.5 G/DL (ref 13.5–17.5)
LYMPHOCYTES # BLD: 1.1 K/UL (ref 1–5.1)
LYMPHOCYTES NFR BLD: 24.2 %
MCH RBC QN AUTO: 27.2 PG (ref 26–34)
MCHC RBC AUTO-ENTMCNC: 34 G/DL (ref 31–36)
MCV RBC AUTO: 79.8 FL (ref 80–100)
MONOCYTES # BLD: 0.6 K/UL (ref 0–1.3)
MONOCYTES NFR BLD: 13.1 %
NEUTROPHILS # BLD: 2.8 K/UL (ref 1.7–7.7)
NEUTROPHILS NFR BLD: 59.9 %
PLATELET # BLD AUTO: 140 K/UL (ref 135–450)
PMV BLD AUTO: 9.3 FL (ref 5–10.5)
POTASSIUM SERPL-SCNC: 4.1 MMOL/L (ref 3.5–5.1)
PROT SERPL-MCNC: 7 G/DL (ref 6.4–8.2)
RBC # BLD AUTO: 5.33 M/UL (ref 4.2–5.9)
SODIUM SERPL-SCNC: 141 MMOL/L (ref 136–145)
WBC # BLD AUTO: 4.7 K/UL (ref 4–11)

## 2025-05-06 PROCEDURE — 1160F RVW MEDS BY RX/DR IN RCRD: CPT | Performed by: FAMILY MEDICINE

## 2025-05-06 PROCEDURE — 3051F HG A1C>EQUAL 7.0%<8.0%: CPT | Performed by: FAMILY MEDICINE

## 2025-05-06 PROCEDURE — 4004F PT TOBACCO SCREEN RCVD TLK: CPT | Performed by: FAMILY MEDICINE

## 2025-05-06 PROCEDURE — 1159F MED LIST DOCD IN RCRD: CPT | Performed by: FAMILY MEDICINE

## 2025-05-06 PROCEDURE — G2211 COMPLEX E/M VISIT ADD ON: HCPCS | Performed by: FAMILY MEDICINE

## 2025-05-06 PROCEDURE — 1123F ACP DISCUSS/DSCN MKR DOCD: CPT | Performed by: FAMILY MEDICINE

## 2025-05-06 PROCEDURE — 99214 OFFICE O/P EST MOD 30 MIN: CPT | Performed by: FAMILY MEDICINE

## 2025-05-06 PROCEDURE — G8427 DOCREV CUR MEDS BY ELIG CLIN: HCPCS | Performed by: FAMILY MEDICINE

## 2025-05-06 PROCEDURE — 83036 HEMOGLOBIN GLYCOSYLATED A1C: CPT | Performed by: FAMILY MEDICINE

## 2025-05-06 PROCEDURE — G8420 CALC BMI NORM PARAMETERS: HCPCS | Performed by: FAMILY MEDICINE

## 2025-05-06 PROCEDURE — G0438 PPPS, INITIAL VISIT: HCPCS | Performed by: FAMILY MEDICINE

## 2025-05-06 RX ORDER — AMOXICILLIN 500 MG/1
500 CAPSULE ORAL 2 TIMES DAILY
Qty: 20 CAPSULE | Refills: 0 | Status: SHIPPED | OUTPATIENT
Start: 2025-05-06 | End: 2025-05-16

## 2025-05-06 RX ORDER — BUPROPION HYDROCHLORIDE 150 MG/1
150 TABLET ORAL EVERY MORNING
Qty: 30 TABLET | Refills: 3 | Status: SHIPPED | OUTPATIENT
Start: 2025-05-06

## 2025-05-06 RX ORDER — SITAGLIPTIN 50 MG/1
50 TABLET, FILM COATED ORAL DAILY
Qty: 30 TABLET | Refills: 0 | Status: SHIPPED | OUTPATIENT
Start: 2025-05-06

## 2025-05-06 SDOH — HEALTH STABILITY: PHYSICAL HEALTH: ON AVERAGE, HOW MANY DAYS PER WEEK DO YOU ENGAGE IN MODERATE TO STRENUOUS EXERCISE (LIKE A BRISK WALK)?: 0 DAYS

## 2025-05-06 SDOH — HEALTH STABILITY: PHYSICAL HEALTH: ON AVERAGE, HOW MANY MINUTES DO YOU ENGAGE IN EXERCISE AT THIS LEVEL?: 0 MIN

## 2025-05-06 ASSESSMENT — PATIENT HEALTH QUESTIONNAIRE - PHQ9
9. THOUGHTS THAT YOU WOULD BE BETTER OFF DEAD, OR OF HURTING YOURSELF: NOT AT ALL
6. FEELING BAD ABOUT YOURSELF - OR THAT YOU ARE A FAILURE OR HAVE LET YOURSELF OR YOUR FAMILY DOWN: MORE THAN HALF THE DAYS
SUM OF ALL RESPONSES TO PHQ QUESTIONS 1-9: 19
7. TROUBLE CONCENTRATING ON THINGS, SUCH AS READING THE NEWSPAPER OR WATCHING TELEVISION: MORE THAN HALF THE DAYS
8. MOVING OR SPEAKING SO SLOWLY THAT OTHER PEOPLE COULD HAVE NOTICED. OR THE OPPOSITE, BEING SO FIGETY OR RESTLESS THAT YOU HAVE BEEN MOVING AROUND A LOT MORE THAN USUAL: NEARLY EVERY DAY
4. FEELING TIRED OR HAVING LITTLE ENERGY: NEARLY EVERY DAY
3. TROUBLE FALLING OR STAYING ASLEEP: NEARLY EVERY DAY
10. IF YOU CHECKED OFF ANY PROBLEMS, HOW DIFFICULT HAVE THESE PROBLEMS MADE IT FOR YOU TO DO YOUR WORK, TAKE CARE OF THINGS AT HOME, OR GET ALONG WITH OTHER PEOPLE: VERY DIFFICULT
5. POOR APPETITE OR OVEREATING: SEVERAL DAYS
2. FEELING DOWN, DEPRESSED OR HOPELESS: NEARLY EVERY DAY
1. LITTLE INTEREST OR PLEASURE IN DOING THINGS: MORE THAN HALF THE DAYS

## 2025-05-06 ASSESSMENT — LIFESTYLE VARIABLES
HOW MANY STANDARD DRINKS CONTAINING ALCOHOL DO YOU HAVE ON A TYPICAL DAY: PATIENT DOES NOT DRINK
HOW OFTEN DO YOU HAVE A DRINK CONTAINING ALCOHOL: NEVER
HOW OFTEN DO YOU HAVE A DRINK CONTAINING ALCOHOL: 1
HOW OFTEN DO YOU HAVE SIX OR MORE DRINKS ON ONE OCCASION: 1
HOW MANY STANDARD DRINKS CONTAINING ALCOHOL DO YOU HAVE ON A TYPICAL DAY: 0

## 2025-05-06 NOTE — PATIENT INSTRUCTIONS
Version: 14.4  © 0913-5499 [email or web address].   Care instructions adapted under license by [employer]. If you have questions about a medical condition or this instruction, always ask your healthcare professional. [employer] disclaims any warranty or liability for your use of this information.         Learning About Being Active as an Older Adult  Why is being active important as you get older?     Being active is one of the best things you can do for your health. And it's never too late to start. Being active--or getting active, if you aren't already--has definite benefits. It can:  Give you more energy,  Keep your mind sharp.  Improve balance to reduce your risk of falls.  Help you manage chronic illness with fewer medicines.  No matter how old you are, how fit you are, or what health problems you have, there is a form of activity that will work for you. And the more physical activity you can do, the better your overall health will be.  What kinds of activity can help you stay healthy?  Being more active will make your daily activities easier. Physical activity includes planned exercise and things you do in daily life. There are four types of activity:  Aerobic.  Doing aerobic activity makes your heart and lungs strong.  Includes walking, dancing, and gardening.  Aim for at least 2½ hours spread throughout the week.  It improves your energy and can help you sleep better.  Muscle-strengthening.  This type of activity can help maintain muscle and strengthen bones.  Includes climbing stairs, using resistance bands, and lifting or carrying heavy loads.  Aim for at least twice a week.  It can help protect the knees and other joints.  Stretching.  Stretching gives you better range of motion in joints and muscles.  Includes upper arm stretches, calf stretches, and gentle yoga.  Aim for at least twice a week, preferably after your muscles are warmed up from other activities.  It can help you function

## 2025-05-06 NOTE — TELEPHONE ENCOUNTER
Please advise; thank you!    Patient Phone Number: [phone] (home)     Last appt: 5/6/2025   Next appt: 9/8/2025

## 2025-05-06 NOTE — PROGRESS NOTES
Medicare Annual Wellness Visit    Bruno CONN Brayan is here for Medicare AWV, Diabetes (Type 2 Diabetes F/U; last A1C= 7.3% on 12/3/24), and Discuss Medications (which one supposed to be taking- JANUVIA 50 MG tablet or JARDIANCE 25 MG tablet)    Assessment & Plan   Type 2 diabetes mellitus with other circulatory complication, without long-term current use of insulin ([facility])  Continue the Jardiance 25 mg daily and Januvia 50 mg daily  -     POCT glycosylated hemoglobin (Hb A1C)  -     CBC with Auto Differential; Future  -     Comprehensive Metabolic Panel; Future  Protracted URI  We will start him on amoxicillin.  If his symptoms of coughing with sputum production does not get better then we will get a chest x-ray.  Chronic diastolic CHF (congestive heart failure) ([facility])  Currently stable    Primary hypertension  Currently stable advised to get the blood work  Continue the valsartan 80 mg daily and amlodipine 5 mg daily.    Primary insomnia  Continue the Seroquel.    Encounter for subsequent annual wellness visit (AWV) in Medicare patient  Advised to get blood work.     Depression:  Will restart the Wellbutrin.    Follow-up in 3 months     Subjective He has history of recurrent strokes, bilateral carotid artery stenosis, hypertension, seizures, type 2 diabetes, congestive heart failure, progressive dementia coming for annual wellness visit.    History of seizures: Currently he is taking Keppra 500 mg twice a day.   He was found to have 4 mm left thalamic/thalamocapsular region hyperdensity suspicious for intraparenchymal hemorrhage in the setting of therapeutic INR while on coumadin.  Currently has been discontinued Plavix and Coumadin.    He was recently diagnosed of Parkinson's and was started on Sinemet 25/100 mg daily     For his blood pressure currently taking amlodipine 5 mg daily and valsartan 80 mg daily.  His blood pressure is stable.  Denies any dizziness.     A1c is 7.4  Currently taking Jardiance 25 mg daily.

## 2025-05-07 ENCOUNTER — RESULTS FOLLOW-UP (OUTPATIENT)
Dept: FAMILY MEDICINE CLINIC | Age: 85
End: 2025-05-07

## 2025-05-14 RX ORDER — AMOXICILLIN 500 MG/1
500 CAPSULE ORAL 2 TIMES DAILY
Qty: 20 CAPSULE | Refills: 1 | OUTPATIENT
Start: 2025-05-14 | End: 2025-05-24

## 2025-05-14 NOTE — TELEPHONE ENCOUNTER
Medication:   Requested Prescriptions     Pending Prescriptions Disp Refills    amoxicillin (AMOXIL) 500 MG capsule [Pharmacy Med Name: AMOXICILLIN 500 MG CAPSULE 500 Capsule] 20 capsule 1     Sig: TAKE 1 CAPSULE BY MOUTH 2 TIMES DAILY FOR 10 DAYS        Last Filled:  05/06/2025     Duplicate request    Patient Phone Number: [phone] (home)     Last appt: 5/6/2025   Next appt: 9/8/2025    Last OARRS:        No data to display

## 2025-05-20 ENCOUNTER — TELEPHONE (OUTPATIENT)
Dept: FAMILY MEDICINE CLINIC | Age: 85
End: 2025-05-20

## 2025-05-20 NOTE — TELEPHONE ENCOUNTER
----- Message from Dr. Megan Melton MD sent at 5/19/2025  5:21 PM EDT -----  He needs hospital follow up appointment

## 2025-05-29 ENCOUNTER — OFFICE VISIT (OUTPATIENT)
Dept: FAMILY MEDICINE CLINIC | Age: 85
End: 2025-05-29

## 2025-05-29 VITALS
HEIGHT: 64 IN | WEIGHT: 116 LBS | BODY MASS INDEX: 19.81 KG/M2 | SYSTOLIC BLOOD PRESSURE: 133 MMHG | HEART RATE: 75 BPM | DIASTOLIC BLOOD PRESSURE: 88 MMHG | TEMPERATURE: 98.1 F | OXYGEN SATURATION: 98 %

## 2025-05-29 DIAGNOSIS — Z91.81 AT HIGH RISK FOR FALLS: ICD-10-CM

## 2025-05-29 DIAGNOSIS — R35.0 INCREASED FREQUENCY OF URINATION: Primary | ICD-10-CM

## 2025-05-29 DIAGNOSIS — E11.59 TYPE 2 DIABETES MELLITUS WITH OTHER CIRCULATORY COMPLICATION, WITHOUT LONG-TERM CURRENT USE OF INSULIN (HCC): ICD-10-CM

## 2025-05-29 DIAGNOSIS — I10 PRIMARY HYPERTENSION: ICD-10-CM

## 2025-05-29 RX ORDER — BUTALBITAL, ACETAMINOPHEN AND CAFFEINE 50; 325; 40 MG/1; MG/1; MG/1
1-2 TABLET ORAL EVERY 6 HOURS PRN
COMMUNITY
Start: 2025-05-18

## 2025-05-29 RX ORDER — RIVASTIGMINE TARTRATE 1.5 MG/1
1.5 CAPSULE ORAL DAILY
COMMUNITY
Start: 2024-06-20

## 2025-05-29 RX ORDER — TRAZODONE HYDROCHLORIDE 100 MG/1
100 TABLET ORAL NIGHTLY
COMMUNITY
Start: 2025-05-29

## 2025-05-30 NOTE — PROGRESS NOTES
Chief Complaint: ED Follow-up (5/18/2025 [facility] for fall with injury; traumatic subarachnoid hemorrhage without loss of consciousness, initial encounter ) and Fall (recent falls w/injury (fell again today))       HPI:  Bruno Schmidt is a 85 y.o. male here for ER follow-up visit.  He happened to have a fall on 5/18/2025.  In the past he had subarachnoid hemorrhage.  However the size of the subarachnoid hemorrhage had not changed.  He has been having increased frequency of urination.  At times he does not wait till the family helps him to go to the restroom.  He does not use his walker correctly and that results in accidental falls.  He has got physical therapy couple of times.  He still continues to forget how to use the walker.  He has a history of Parkinson's disease.  Currently on medication.  He is also very forgetful.  Family helps him with almost everything.    He denies having dysuria but has increased frequency of urination.  He has type 2 diabetes currently his A1c is 7.4.  He has been on Januvia 50 mg daily.    His blood pressure well-controlled.  Denies any other concern.  His mood has been stable with Wellbutrin.  He has been eating better.    ROS:  Constitutional: Negative   HENT: Negative   Respiratory: Negative   Cardiovascular: Negative   Gastrointestinal: Negative   Genitourinary: As mentioned above  Musculoskeletal: As mentioned above  Skin: Negative  Neurological: Negative   Psychiatric/Behavioral: Negative     Patient's problem list, medications, allergies, past medical, surgical, social and family histories were reviewed and updated as appropriate.     Current Outpatient Medications   Medication Sig Dispense Refill    butalbital-acetaminophen-caffeine (FIORICET, ESGIC) -40 MG per tablet Take 1-2 tablets by mouth every 6 hours as needed      rivastigmine (EXELON) 1.5 MG capsule Take 1 capsule by mouth daily      traZODone (DESYREL) 100 MG tablet Take 1 tablet by

## 2025-06-03 ENCOUNTER — PATIENT MESSAGE (OUTPATIENT)
Dept: FAMILY MEDICINE CLINIC | Age: 85
End: 2025-06-03

## 2025-06-17 ENCOUNTER — TELEPHONE (OUTPATIENT)
Dept: FAMILY MEDICINE CLINIC | Age: 85
End: 2025-06-17

## 2025-06-17 DIAGNOSIS — R32 URINARY INCONTINENCE, UNSPECIFIED TYPE: ICD-10-CM

## 2025-06-17 NOTE — TELEPHONE ENCOUNTER
Pt would like script for the diapers to be sent to following pharmacy.     [facility] - [place], [place] - [address] [place] [address] -  [phone] - F [phone]

## 2025-06-18 NOTE — TELEPHONE ENCOUNTER
Medication:   Requested Prescriptions     Pending Prescriptions Disp Refills    Diapers & Supplies MISC 100 each 3     Si each by Does not apply route daily        Last Filled:  2024 100 each, Refills: 3 ordered     Patient Phone Number: [phone] (home)     Last appt: 2025   Next appt: 2025    Last OARRS:        No data to display

## 2025-06-27 ENCOUNTER — TELEPHONE (OUTPATIENT)
Dept: FAMILY MEDICINE CLINIC | Age: 85
End: 2025-06-27

## 2025-06-27 DIAGNOSIS — R56.9 SEIZURE (HCC): ICD-10-CM

## 2025-06-27 RX ORDER — QUETIAPINE FUMARATE 50 MG/1
50 TABLET, FILM COATED ORAL DAILY
Qty: 90 TABLET | Refills: 0 | Status: SHIPPED | OUTPATIENT
Start: 2025-06-27

## 2025-06-27 RX ORDER — LEVETIRACETAM 500 MG/1
500 TABLET ORAL 2 TIMES DAILY
Qty: 60 TABLET | Refills: 4 | Status: SHIPPED | OUTPATIENT
Start: 2025-06-27 | End: 2026-06-27

## 2025-06-27 NOTE — TELEPHONE ENCOUNTER
Pt's child called in asking if he could get a higher dosage of a sleeping medication the pt is taking as its not really working. Pt did not know name of medication or dosage.     Please advise

## 2025-06-27 NOTE — TELEPHONE ENCOUNTER
Patient does not do well with trazadone, asking if they can increase the seroquel as it helps him sleep well. Please advise.     Asking for trazodone to be discontinued entirely. Has not been taking

## 2025-06-27 NOTE — TELEPHONE ENCOUNTER
Medication:   Requested Prescriptions     Pending Prescriptions Disp Refills    levETIRAcetam (KEPPRA) 500 MG tablet [Pharmacy Med Name: LEVETIRACETAM 500 MG TABLET 500 Tablet] 60 tablet 4     Sig: TAKE 1 TABLET BY MOUTH 2 TIMES DAILY        Last Filled:  3/3/2025 60 tabs 3 refills     Patient Phone Number: [phone] (home)     Last appt: 5/29/2025   Next appt: 6/27/2025    Last OARRS:        No data to display

## 2025-07-15 PROBLEM — G20.A1 PARKINSON'S DISEASE (HCC): Status: ACTIVE | Noted: 2025-07-15

## 2025-07-28 ENCOUNTER — OFFICE VISIT (OUTPATIENT)
Dept: FAMILY MEDICINE CLINIC | Age: 85
End: 2025-07-28

## 2025-07-28 VITALS
SYSTOLIC BLOOD PRESSURE: 105 MMHG | DIASTOLIC BLOOD PRESSURE: 63 MMHG | TEMPERATURE: 98.8 F | HEIGHT: 66 IN | BODY MASS INDEX: 18.96 KG/M2 | WEIGHT: 118 LBS | HEART RATE: 77 BPM | OXYGEN SATURATION: 98 %

## 2025-07-28 DIAGNOSIS — J06.9 PROTRACTED URI: Primary | ICD-10-CM

## 2025-07-28 DIAGNOSIS — F33.1 MODERATE EPISODE OF RECURRENT MAJOR DEPRESSIVE DISORDER (HCC): ICD-10-CM

## 2025-07-28 DIAGNOSIS — F51.01 PRIMARY INSOMNIA: ICD-10-CM

## 2025-07-28 RX ORDER — DOXYCYCLINE HYCLATE 100 MG
100 TABLET ORAL 2 TIMES DAILY
Qty: 20 TABLET | Refills: 0 | Status: SHIPPED | OUTPATIENT
Start: 2025-07-28 | End: 2025-08-07

## 2025-07-28 NOTE — PROGRESS NOTES
Chief Complaint: Cough       HPI:  Bruno Schmidt is a 85 y.o. male here with c/o productive cough ongoing since couple of days.  Denies any shortness of breath or wheezing or sore throat or fever.  Gets bouts of cough.  He has history of recurrent strokes, bilateral carotid artery stenosis, hypertension, seizures, type 2 diabetes, congestive heart failure, progressive dementia coming for annual wellness visit.     History of seizures: Currently he is taking Keppra 500 mg twice a day.   He was found to have 4 mm left thalamic/thalamocapsular region hyperdensity suspicious for intraparenchymal hemorrhage in the setting of therapeutic INR while on coumadin.  Currently has been discontinued Plavix and Coumadin.     He was recently diagnosed of Parkinson's and was started on Sinemet 25/100 mg daily     For his blood pressure currently taking amlodipine 5 mg daily and valsartan 80 mg daily.  His blood pressure is stable.  Denies any dizziness.     A1c is 7.4  Currently taking Jardiance 25 mg daily.       Currently on Wellbutrin extended release 150 daily and Seroquel 25 mg daily.  As per his family he still struggles to sleep and sleeps in the morning.    ROS:  Constitutional: Negative  HENT: As mentioned above  Respiratory: As mentioned above  Cardiovascular: Negative for chest pain, palpitations and leg swelling.   Gastrointestinal: Negative   Genitourinary: Negative  Musculoskeletal: Negative    Patient's problem list, medications, allergies, past medical, surgical, social and family histories were reviewed and updated as appropriate.     Current Outpatient Medications   Medication Sig Dispense Refill    doxycycline hyclate (VIBRA-TABS) 100 MG tablet Take 1 tablet by mouth 2 times daily for 10 days 20 tablet 0    levETIRAcetam (KEPPRA) 500 MG tablet TAKE 1 TABLET BY MOUTH 2 TIMES DAILY 60 tablet 4    QUEtiapine (SEROQUEL) 50 MG tablet Take 1 tablet by mouth daily 90 tablet 0    Diapers & Supplies MISC 1 each by Does not

## 2025-08-04 DIAGNOSIS — F51.01 PRIMARY INSOMNIA: ICD-10-CM

## 2025-08-04 DIAGNOSIS — J06.9 PROTRACTED URI: ICD-10-CM

## 2025-08-04 RX ORDER — BUPROPION HYDROCHLORIDE 150 MG/1
150 TABLET ORAL EVERY MORNING
Qty: 30 TABLET | Refills: 4 | Status: SHIPPED | OUTPATIENT
Start: 2025-08-04

## 2025-08-04 RX ORDER — SITAGLIPTIN 50 MG/1
50 TABLET, FILM COATED ORAL DAILY
Qty: 30 TABLET | Refills: 1 | Status: SHIPPED | OUTPATIENT
Start: 2025-08-04

## 2025-08-04 RX ORDER — TRAZODONE HYDROCHLORIDE 100 MG/1
100 TABLET ORAL NIGHTLY
Qty: 90 TABLET | Refills: 4 | OUTPATIENT
Start: 2025-08-04

## 2025-08-04 RX ORDER — QUETIAPINE FUMARATE 50 MG/1
50 TABLET, FILM COATED ORAL DAILY
Qty: 90 TABLET | Refills: 1 | Status: SHIPPED | OUTPATIENT
Start: 2025-08-04

## 2025-08-04 RX ORDER — DOXYCYCLINE HYCLATE 100 MG
100 TABLET ORAL 2 TIMES DAILY
Qty: 20 TABLET | Refills: 1 | OUTPATIENT
Start: 2025-08-04 | End: 2025-08-14

## 2025-08-18 ENCOUNTER — TELEPHONE (OUTPATIENT)
Dept: FAMILY MEDICINE CLINIC | Age: 85
End: 2025-08-18

## 2025-08-18 DIAGNOSIS — Z86.73 HISTORY OF CVA (CEREBROVASCULAR ACCIDENT): Primary | ICD-10-CM

## 2025-08-18 DIAGNOSIS — R29.6 RECURRENT FALLS: ICD-10-CM

## 2025-08-18 DIAGNOSIS — R26.81 GAIT INSTABILITY: ICD-10-CM

## 2025-08-29 RX ORDER — QUETIAPINE FUMARATE 25 MG/1
TABLET, FILM COATED ORAL
COMMUNITY
Start: 2025-07-28

## 2025-09-02 ENCOUNTER — TELEMEDICINE (OUTPATIENT)
Dept: FAMILY MEDICINE CLINIC | Age: 85
End: 2025-09-02

## 2025-09-02 DIAGNOSIS — K59.00 CONSTIPATION, UNSPECIFIED CONSTIPATION TYPE: Primary | ICD-10-CM

## 2025-09-02 DIAGNOSIS — R45.4 IRRITABLE: ICD-10-CM

## 2025-09-02 RX ORDER — SENNA AND DOCUSATE SODIUM 50; 8.6 MG/1; MG/1
2 TABLET, FILM COATED ORAL DAILY
Qty: 60 TABLET | Refills: 2 | Status: SHIPPED | OUTPATIENT
Start: 2025-09-02 | End: 2025-12-01